# Patient Record
Sex: FEMALE | Race: WHITE | Employment: FULL TIME | URBAN - NONMETROPOLITAN AREA
[De-identification: names, ages, dates, MRNs, and addresses within clinical notes are randomized per-mention and may not be internally consistent; named-entity substitution may affect disease eponyms.]

---

## 2017-12-04 ENCOUNTER — HOSPITAL ENCOUNTER (OUTPATIENT)
Dept: OTHER | Age: 52
Discharge: HOME | End: 2017-12-04
Attending: NURSE PRACTITIONER | Admitting: NURSE PRACTITIONER

## 2017-12-04 ENCOUNTER — OFFICE VISIT (OUTPATIENT)
Dept: OTHER | Age: 52
End: 2017-12-04

## 2017-12-04 VITALS
DIASTOLIC BLOOD PRESSURE: 84 MMHG | BODY MASS INDEX: 28.89 KG/M2 | HEART RATE: 78 BPM | OXYGEN SATURATION: 95 % | SYSTOLIC BLOOD PRESSURE: 130 MMHG | TEMPERATURE: 98.2 F | WEIGHT: 190 LBS

## 2017-12-04 DIAGNOSIS — R05.9 COUGH: ICD-10-CM

## 2017-12-04 DIAGNOSIS — Z00.00 ROUTINE HEALTH MAINTENANCE: ICD-10-CM

## 2017-12-04 DIAGNOSIS — J31.0 RHINOSINUSITIS: Primary | ICD-10-CM

## 2017-12-04 DIAGNOSIS — B00.9 RECURRENT HSV (HERPES SIMPLEX VIRUS): ICD-10-CM

## 2017-12-04 DIAGNOSIS — J32.9 RHINOSINUSITIS: Primary | ICD-10-CM

## 2017-12-04 PROCEDURE — 99202 OFFICE O/P NEW SF 15 MIN: CPT | Performed by: NURSE PRACTITIONER

## 2017-12-04 RX ORDER — VALACYCLOVIR HYDROCHLORIDE 500 MG/1
500 TABLET, FILM COATED ORAL DAILY PRN
Qty: 30 TABLET | Refills: 6 | Status: SHIPPED | OUTPATIENT
Start: 2017-12-04 | End: 2019-03-13 | Stop reason: SDUPTHER

## 2017-12-04 RX ORDER — DOXYCYCLINE HYCLATE 100 MG/1
100 CAPSULE ORAL 2 TIMES DAILY
Qty: 14 CAPSULE | Refills: 0 | Status: SHIPPED | OUTPATIENT
Start: 2017-12-04 | End: 2017-12-11

## 2017-12-04 RX ORDER — BENZONATATE 100 MG/1
100 CAPSULE ORAL 3 TIMES DAILY PRN
Qty: 21 CAPSULE | Refills: 0 | Status: SHIPPED | OUTPATIENT
Start: 2017-12-04 | End: 2017-12-11

## 2017-12-04 RX ORDER — AZELASTINE 1 MG/ML
2 SPRAY, METERED NASAL 2 TIMES DAILY
Qty: 1 BOTTLE | Refills: 3 | Status: SHIPPED | OUTPATIENT
Start: 2017-12-04 | End: 2018-01-31 | Stop reason: ALTCHOICE

## 2017-12-04 ASSESSMENT — ENCOUNTER SYMPTOMS
CHOKING: 1
NAUSEA: 0
TROUBLE SWALLOWING: 0
SINUS PAIN: 1
CHEST TIGHTNESS: 0
VOMITING: 0
SINUS PRESSURE: 1
SHORTNESS OF BREATH: 1
SORE THROAT: 0
RHINORRHEA: 1
FACIAL SWELLING: 0
COUGH: 1
WHEEZING: 0
DIARRHEA: 0

## 2017-12-04 NOTE — PATIENT INSTRUCTIONS
nasal passages open and wash out mucus and bacteria. You can buy saline nose drops at a grocery store or drugstore. Or you can make your own at home by adding 1 teaspoon of salt and 1 teaspoon of baking soda to 2 cups of distilled water. If you make your own, fill a bulb syringe with the solution, insert the tip into your nostril, and squeeze gently. Ethelda Josette your nose. · Put a hot, wet towel or a warm gel pack on your face 3 or 4 times a day for 5 to 10 minutes each time. · Try a decongestant nasal spray like oxymetazoline (Afrin). Do not use it for more than 3 days in a row. Using it for more than 3 days can make your congestion worse. When should you call for help? Call your doctor now or seek immediate medical care if:  · You have new or worse swelling or redness in your face or around your eyes. · You have a new or higher fever. Watch closely for changes in your health, and be sure to contact your doctor if:  · You have new or worse facial pain. · The mucus from your nose becomes thicker (like pus) or has new blood in it. · You are not getting better as expected. Where can you learn more? Go to https://[employer]. org and sign in to your [employer] account. Enter O087 in the [employer] box to learn more about \"Sinusitis: Care Instructions. \"     If you do not have an account, please click on the \"Sign Up Now\" link. Current as of: July 29, 2016  Content Version: 11.3  © 6169-9291 [employer]. Care instructions adapted under license by [employer]. If you have questions about a medical condition or this instruction, always ask your healthcare professional. [place] [address] any warranty or liability for your use of this information. Patient Education        Cough: Care Instructions  Your Care Instructions  A cough is your body's response to something that bothers your throat or airways. Many things can cause a cough.  You might cough because of a cold or the flu, bronchitis, or asthma. Smoking, postnasal drip, allergies, and stomach acid that backs up into your throat also can cause coughs. A cough is a symptom, not a disease. Most coughs stop when the cause, such as a cold, goes away. You can take a few steps at home to cough less and feel better. Follow-up care is a key part of your treatment and safety. Be sure to make and go to all appointments, and call your doctor if you are having problems. It's also a good idea to know your test results and keep a list of the medicines you take. How can you care for yourself at home? · Drink lots of water and other fluids. This helps thin the mucus and soothes a dry or sore throat. Honey or lemon juice in hot water or tea may ease a dry cough. · Take cough medicine as directed by your doctor. · Prop up your head on pillows to help you breathe and ease a dry cough. · Try cough drops to soothe a dry or sore throat. Cough drops don't stop a cough. Medicine-flavored cough drops are no better than candy-flavored drops or hard candy. · Do not smoke. Avoid secondhand smoke. If you need help quitting, talk to your doctor about stop-smoking programs and medicines. These can increase your chances of quitting for good. When should you call for help? Call 911 anytime you think you may need emergency care. For example, call if:  · You have severe trouble breathing. Call your doctor now or seek immediate medical care if:  · You cough up blood. · You have new or worse trouble breathing. · You have a new or higher fever. · You have a new rash. Watch closely for changes in your health, and be sure to contact your doctor if:  · You cough more deeply or more often, especially if you notice more mucus or a change in the color of your mucus. · You have new symptoms, such as a sore throat, an earache, or sinus pain. · You do not get better as expected. Where can you learn more?   Go to over-the-counter:  ¨ Nicotine patches  ¨ Nicotine gum and lozenges  ¨ Nicotine inhaler  · Ask your doctor about bupropion (Wellbutrin) or varenicline (Chantix), which are prescription medicines. They do not contain nicotine. They help you by reducing withdrawal symptoms, such as stress and anxiety. · Some people find hypnosis, acupuncture, and massage helpful for ending the smoking habit. · Eat a healthy diet and get regular exercise. Having healthy habits will help your body move past its craving for nicotine. · Be prepared to keep trying. Most people are not successful the first few times they try to quit. Do not get mad at yourself if you smoke again. Make a list of things you learned and think about when you want to try again, such as next week, next month, or next year. Where can you learn more? Go to https://[employer]HungerTime. org and sign in to your [employer] account. Enter K765 in the [employer] box to learn more about \"Stopping Smoking: Care Instructions. \"     If you do not have an account, please click on the \"Sign Up Now\" link. Current as of: March 20, 2017  Content Version: 11.3  © 3970-1822 [employer]. Care instructions adapted under license by [facility] ([facility]). If you have questions about a medical condition or this instruction, always ask your healthcare professional. [address] any warranty or liability for your use of this information.

## 2017-12-04 NOTE — LETTER
[address]  [address] [facility] [address]  Phone: [phone]  Fax: [phone]    Armando Mc        December 4, 2017     Patient: Didi Fernandez   YOB: 1965   Date of Visit: 12/4/2017       To Whom It May Concern: It is my medical opinion that Mohinder Bear may return to work on Tuesday, December 5, 2017. If you have any questions or concerns, please don't hesitate to call.     Sincerely,        Jonathan Ren, CNP

## 2017-12-04 NOTE — PROGRESS NOTES
Negative for chest tightness and wheezing. Cardiovascular: Negative for chest pain, palpitations and leg swelling. Gastrointestinal: Negative for diarrhea, nausea and vomiting. Skin: Negative for rash. Neurological: Positive for headaches. OBJECTIVE:     /84 (Site: Left Arm, Position: Sitting, Cuff Size: Medium Adult)   Pulse 78   Temp 98.2 °F (36.8 °C) (Oral)   Wt 190 lb (86.2 kg)   SpO2 95%   BMI 28.89 kg/m²     Physical Exam   Constitutional: She is oriented to person, place, and time. She appears well-developed and well-nourished. HENT:   Head: Normocephalic. Right Ear: External ear and ear canal normal. Tympanic membrane is not erythematous and not bulging. Left Ear: External ear and ear canal normal. Tympanic membrane is bulging. Tympanic membrane is not erythematous. Nose: Mucosal edema and rhinorrhea present. Right sinus exhibits frontal sinus tenderness. Right sinus exhibits no maxillary sinus tenderness. Left sinus exhibits frontal sinus tenderness. Left sinus exhibits no maxillary sinus tenderness. Mouth/Throat: Uvula is midline and mucous membranes are normal. Oropharyngeal exudate (PND) and posterior oropharyngeal erythema present. No posterior oropharyngeal edema. Eyes: Pupils are equal, round, and reactive to light. Cardiovascular: Normal rate, regular rhythm and normal heart sounds. Pulmonary/Chest: Effort normal and breath sounds normal. No respiratory distress. She has no wheezes. She has no rales. Lymphadenopathy:     She has cervical adenopathy. Neurological: She is alert and oriented to person, place, and time. Skin: Skin is warm and dry. ASSESSMENT AND PLAN:     1. Rhinosinusitis  - doxycycline hyclate (VIBRAMYCIN) 100 MG capsule; Take 1 capsule by mouth 2 times daily for 7 days  Dispense: 14 capsule;  Refill: 0  - azelastine (ASTELIN) 0.1 % nasal spray; 2 sprays by Nasal route 2 times daily Use in each nostril as directed  Dispense: 1 Bottle; Refill: 3  - Continue sinus irrigation, humidification. Encouraged warm compresses w/precaution to face    2. Cough  - azelastine (ASTELIN) 0.1 % nasal spray; 2 sprays by Nasal route 2 times daily Use in each nostril as directed  Dispense: 1 Bottle; Refill: 3  - benzonatate (TESSALON PERLES) 100 MG capsule; Take 1 capsule by mouth 3 times daily as needed for Cough  Dispense: 21 capsule; Refill: 0  - Encouraged to quit smoking    3. Recurrent HSV (herpes simplex virus)  - valACYclovir (VALTREX) 500 MG tablet; Take 1 tablet by mouth daily as needed (HSV outbreak) For oral ulcer flares  Dispense: 30 tablet; Refill: 6    4. Routine health maintenance  - Had routine labs done for employment purposes approximately 3 months ago. She is agreeable to bring record to primary care appt  - Deferred pneumovax and tdap to primary care  - HEPATITIS C ANTIBODY; Future  - HIV-1 AND HIV-2 ANTIBODIES; Future    Future Appointments      Provider Champ Champion   1/3/2018 3:00 PM Delilah Coreas CNP [address] [place] [address] FM & PEDS [facility]       Return if symptoms worsen or fail to improve. Care discussed with patient. Questions answered and patient verbalizes understanding and agrees with plan. Medications reviewed and reconciled. Continue current medications. Appropriate prescriptions are ordered. Risks and benefits of meds are discussed. After visit summary provided. Advised to call for any problems, questions, or concerns. Patient educated on signs and symptoms of exacerbation and when to seek further medical attention. Patient verbalized understanding of these signs and symptoms as well as the plan of care. Patient will be contacted in two to three days to check on progress.

## 2017-12-06 ENCOUNTER — TELEPHONE (OUTPATIENT)
Dept: OTHER | Age: 52
End: 2017-12-06

## 2018-01-03 ENCOUNTER — OFFICE VISIT (OUTPATIENT)
Dept: FAMILY MEDICINE CLINIC | Age: 53
End: 2018-01-03

## 2018-01-03 ENCOUNTER — HOSPITAL ENCOUNTER (OUTPATIENT)
Dept: GENERAL RADIOLOGY | Age: 53
Discharge: HOME | End: 2018-01-03
Attending: NURSE PRACTITIONER | Admitting: NURSE PRACTITIONER

## 2018-01-03 VITALS
WEIGHT: 196.4 LBS | RESPIRATION RATE: 16 BRPM | BODY MASS INDEX: 29.77 KG/M2 | HEIGHT: 68 IN | SYSTOLIC BLOOD PRESSURE: 120 MMHG | HEART RATE: 87 BPM | OXYGEN SATURATION: 97 % | DIASTOLIC BLOOD PRESSURE: 80 MMHG

## 2018-01-03 DIAGNOSIS — R06.2 WHEEZING: ICD-10-CM

## 2018-01-03 DIAGNOSIS — R05.9 COUGH: ICD-10-CM

## 2018-01-03 DIAGNOSIS — Z00.00 ROUTINE HEALTH MAINTENANCE: ICD-10-CM

## 2018-01-03 DIAGNOSIS — J40 BRONCHITIS: Primary | ICD-10-CM

## 2018-01-03 DIAGNOSIS — Z12.31 ENCOUNTER FOR SCREENING MAMMOGRAM FOR BREAST CANCER: ICD-10-CM

## 2018-01-03 DIAGNOSIS — M54.50 ACUTE MIDLINE LOW BACK PAIN WITHOUT SCIATICA: ICD-10-CM

## 2018-01-03 DIAGNOSIS — Z12.11 SCREENING FOR COLON CANCER: ICD-10-CM

## 2018-01-03 DIAGNOSIS — J34.89 RHINORRHEA: ICD-10-CM

## 2018-01-03 DIAGNOSIS — Z13.220 SCREENING FOR HYPERLIPIDEMIA: ICD-10-CM

## 2018-01-03 LAB
ALBUMIN SERPL-MCNC: 4 GM/DL (ref 3.4–5)
ALP BLD-CCNC: 96 IU/L (ref 40–129)
ALT SERPL-CCNC: 23 U/L (ref 10–40)
ANION GAP SERPL CALCULATED.3IONS-SCNC: 7 MMOL/L (ref 4–16)
AST SERPL-CCNC: 19 IU/L (ref 15–37)
BASOPHILS ABSOLUTE: 0.1 K/CU MM
BASOPHILS RELATIVE PERCENT: 0.7 % (ref 0–1)
BILIRUB SERPL-MCNC: 0.2 MG/DL (ref 0–1)
BUN BLDV-MCNC: 15 MG/DL (ref 6–23)
CALCIUM SERPL-MCNC: 9.4 MG/DL (ref 8.3–10.6)
CHLORIDE BLD-SCNC: 103 MMOL/L (ref 99–110)
CO2: 33 MMOL/L (ref 21–32)
CREAT SERPL-MCNC: 0.8 MG/DL (ref 0.6–1.1)
DIFFERENTIAL TYPE: ABNORMAL
EOSINOPHILS ABSOLUTE: 0.1 K/CU MM
EOSINOPHILS RELATIVE PERCENT: 1.1 % (ref 0–3)
GFR AFRICAN AMERICAN: >60 ML/MIN/1.73M2
GFR NON-AFRICAN AMERICAN: >60 ML/MIN/1.73M2
GLUCOSE BLD-MCNC: 87 MG/DL (ref 70–99)
HCT VFR BLD CALC: 48.4 % (ref 37–47)
HEMOGLOBIN: 15.7 GM/DL (ref 12.5–16)
HIV SCREEN: NON REACTIVE
IMMATURE NEUTROPHIL %: 0.4 % (ref 0–0.43)
LYMPHOCYTES ABSOLUTE: 3.1 K/CU MM
LYMPHOCYTES RELATIVE PERCENT: 24.3 % (ref 24–44)
MCH RBC QN AUTO: 28.9 PG (ref 27–31)
MCHC RBC AUTO-ENTMCNC: 32.4 % (ref 32–36)
MCV RBC AUTO: 89 FL (ref 78–100)
MONOCYTES ABSOLUTE: 0.8 K/CU MM
MONOCYTES RELATIVE PERCENT: 6.1 % (ref 0–4)
PDW BLD-RTO: 14 % (ref 11.7–14.9)
PLATELET # BLD: 311 K/CU MM (ref 140–440)
PMV BLD AUTO: 11 FL (ref 7.5–11.1)
POTASSIUM SERPL-SCNC: 4.5 MMOL/L (ref 3.5–5.1)
RBC # BLD: 5.44 M/CU MM (ref 4.2–5.4)
SEGMENTED NEUTROPHILS ABSOLUTE COUNT: 8.7 K/CU MM
SEGMENTED NEUTROPHILS RELATIVE PERCENT: 67.4 % (ref 36–66)
SODIUM BLD-SCNC: 143 MMOL/L (ref 135–145)
TOTAL IMMATURE NEUTOROPHIL: 0.05 K/CU MM
TOTAL PROTEIN: 7.1 GM/DL (ref 6.4–8.2)
WBC # BLD: 12.8 K/CU MM (ref 4–10.5)

## 2018-01-03 PROCEDURE — 99214 OFFICE O/P EST MOD 30 MIN: CPT | Performed by: NURSE PRACTITIONER

## 2018-01-03 RX ORDER — ALBUTEROL SULFATE 90 UG/1
2 AEROSOL, METERED RESPIRATORY (INHALATION) EVERY 6 HOURS PRN
Qty: 1 INHALER | Refills: 0 | Status: SHIPPED | OUTPATIENT
Start: 2018-01-03 | End: 2018-04-30 | Stop reason: CLARIF

## 2018-01-03 RX ORDER — PREDNISONE 20 MG/1
TABLET ORAL
Qty: 30 TABLET | Refills: 0 | Status: SHIPPED | OUTPATIENT
Start: 2018-01-03 | End: 2018-01-31 | Stop reason: ALTCHOICE

## 2018-01-03 RX ORDER — BENZONATATE 100 MG/1
100-200 CAPSULE ORAL 3 TIMES DAILY PRN
Qty: 30 CAPSULE | Refills: 0 | Status: SHIPPED | OUTPATIENT
Start: 2018-01-03 | End: 2018-01-10

## 2018-01-03 RX ORDER — FLUTICASONE PROPIONATE 50 MCG
1-2 SPRAY, SUSPENSION (ML) NASAL DAILY
Qty: 1 BOTTLE | Refills: 1 | Status: SHIPPED | OUTPATIENT
Start: 2018-01-03 | End: 2019-03-13 | Stop reason: ALTCHOICE

## 2018-01-03 ASSESSMENT — ENCOUNTER SYMPTOMS
COUGH: 1
CHEST TIGHTNESS: 0
BACK PAIN: 1
WHEEZING: 1
ABDOMINAL PAIN: 0
SORE THROAT: 1
RHINORRHEA: 1
DIARRHEA: 0
NAUSEA: 0
CONSTIPATION: 0
SHORTNESS OF BREATH: 0
VOMITING: 0

## 2018-01-03 ASSESSMENT — PATIENT HEALTH QUESTIONNAIRE - PHQ9
2. FEELING DOWN, DEPRESSED OR HOPELESS: 0
SUM OF ALL RESPONSES TO PHQ9 QUESTIONS 1 & 2: 0
1. LITTLE INTEREST OR PLEASURE IN DOING THINGS: 0
SUM OF ALL RESPONSES TO PHQ QUESTIONS 1-9: 0

## 2018-01-03 NOTE — PROGRESS NOTES
maintenance        Relevant Orders    CBC    Comprehensive Metabolic Panel    HIV Screen    Lipid Panel    TSH with Reflex    Cough        Relevant Orders    XR CHEST STANDARD (2 VW)    Wheezing        Relevant Medications    albuterol sulfate HFA (PROAIR HFA) 108 (90 Base) MCG/ACT inhaler    Rhinorrhea        Relevant Medications    fluticasone (FLONASE) 50 MCG/ACT nasal spray    Acute midline low back pain without sciatica        Relevant Medications    predniSONE (DELTASONE) 20 MG tablet          Review of Systems   Constitutional: Negative for appetite change, chills, fatigue and unexpected weight change. HENT: Positive for congestion, rhinorrhea and sore throat. Respiratory: Positive for cough and wheezing. Negative for chest tightness and shortness of breath. Cardiovascular: Negative for chest pain, palpitations and leg swelling. Gastrointestinal: Negative for abdominal pain, constipation, diarrhea, nausea and vomiting. Musculoskeletal: Positive for back pain. Negative for arthralgias. Neurological: Positive for headaches. Negative for weakness and numbness. Hematological: Negative for adenopathy. OBJECTIVE:    /80 (Site: Left Arm, Position: Sitting, Cuff Size: Medium Adult)   Pulse 87   Resp 16   Ht 5' 8\" (1.727 m)   Wt 196 lb 6.4 oz (89.1 kg)   SpO2 97%   BMI 29.86 kg/m²     Physical Exam   Constitutional: She is oriented to person, place, and time. She appears well-developed and well-nourished. HENT:   Head: Normocephalic and atraumatic. Right Ear: Hearing, tympanic membrane, external ear and ear canal normal.   Left Ear: Hearing, tympanic membrane, external ear and ear canal normal.   Nose: Mucosal edema present. Right sinus exhibits no maxillary sinus tenderness and no frontal sinus tenderness. Left sinus exhibits no maxillary sinus tenderness and no frontal sinus tenderness.    Mouth/Throat: Uvula is midline and mucous membranes are normal. Oropharyngeal exudate (postnasal drainge) and posterior oropharyngeal erythema present. No posterior oropharyngeal edema. Eyes: Conjunctivae and EOM are normal. Pupils are equal, round, and reactive to light. Neck: Normal range of motion. Neck supple. Cardiovascular: Normal rate, regular rhythm and normal heart sounds. Exam reveals no gallop and no friction rub. No murmur heard. Pulmonary/Chest: Effort normal and breath sounds normal. No respiratory distress. She has no wheezes. She has no rhonchi. She has no rales. Abdominal: Soft. Bowel sounds are normal. She exhibits no distension and no mass. There is no tenderness. There is no guarding. Musculoskeletal: She exhibits no edema. Lumbar back: She exhibits decreased range of motion and tenderness. She exhibits no bony tenderness, no swelling and no edema. Negative SLR   Lymphadenopathy:     She has no cervical adenopathy. Right cervical: No superficial cervical and no posterior cervical adenopathy present. Left cervical: No superficial cervical and no posterior cervical adenopathy present. Neurological: She is alert and oriented to person, place, and time. No cranial nerve deficit. Skin: Skin is warm and dry. Psychiatric: She has a normal mood and affect. Her behavior is normal.       ASSESSMENT/PLAN:    1. Encounter for screening mammogram for breast cancer  - [facility] Digital Screen Bilateral [[id number]]; Future    2. Screening for colon cancer  - POCT FECAL IMMUNOCHEMICAL TEST (FIT); Future  - [address] [place] [address] Gastroenterology- Kelly Daley MD    3. Screening for hyperlipidemia    4. Routine health maintenance  - CBC; Future  - Comprehensive Metabolic Panel; Future  - HIV Screen; Future  - Lipid Panel; Future  - TSH with Reflex; Future    5. Cough  Xray d/t length of cough  - XR CHEST STANDARD (2 VW); Future    6. Wheezing  Albuterol PRN  - albuterol sulfate HFA (PROAIR HFA) 108 (90 Base) MCG/ACT inhaler;  Inhale 2 puffs into the lungs every 6

## 2018-01-04 DIAGNOSIS — Z11.59 NEED FOR HEPATITIS C SCREENING TEST: Primary | ICD-10-CM

## 2018-01-04 LAB
CHOLESTEROL: 189 MG/DL
HDLC SERPL-MCNC: 43 MG/DL
HEPATITIS C ANTIBODY: NON REACTIVE
LDL CHOLESTEROL DIRECT: 114 MG/DL
TRIGL SERPL-MCNC: 140 MG/DL
TSH HIGH SENSITIVITY: 2.08 UIU/ML (ref 0.27–4.2)

## 2018-01-04 RX ORDER — ATORVASTATIN CALCIUM 20 MG/1
20 TABLET, FILM COATED ORAL DAILY
Qty: 30 TABLET | Refills: 3 | Status: SHIPPED | OUTPATIENT
Start: 2018-01-04 | End: 2018-01-17

## 2018-01-17 ENCOUNTER — OFFICE VISIT (OUTPATIENT)
Dept: FAMILY MEDICINE CLINIC | Age: 53
End: 2018-01-17

## 2018-01-17 VITALS
HEART RATE: 100 BPM | SYSTOLIC BLOOD PRESSURE: 124 MMHG | BODY MASS INDEX: 29.07 KG/M2 | RESPIRATION RATE: 16 BRPM | WEIGHT: 191.2 LBS | OXYGEN SATURATION: 98 % | DIASTOLIC BLOOD PRESSURE: 82 MMHG

## 2018-01-17 DIAGNOSIS — Z72.0 TOBACCO ABUSE: ICD-10-CM

## 2018-01-17 DIAGNOSIS — R53.83 FATIGUE, UNSPECIFIED TYPE: ICD-10-CM

## 2018-01-17 DIAGNOSIS — R09.81 NASAL CONGESTION: Primary | ICD-10-CM

## 2018-01-17 DIAGNOSIS — R06.02 SOB (SHORTNESS OF BREATH): ICD-10-CM

## 2018-01-17 LAB
BASOPHILS ABSOLUTE: 0.1 K/UL (ref 0–0.2)
BASOPHILS RELATIVE PERCENT: 0.8 %
EOSINOPHILS ABSOLUTE: 0.1 K/UL (ref 0–0.6)
EOSINOPHILS RELATIVE PERCENT: 1.2 %
HCT VFR BLD CALC: 46.9 % (ref 36–48)
HEMOGLOBIN: 15.7 G/DL (ref 12–16)
LYMPHOCYTES ABSOLUTE: 2.3 K/UL (ref 1–5.1)
LYMPHOCYTES RELATIVE PERCENT: 18.1 %
MCH RBC QN AUTO: 29.8 PG (ref 26–34)
MCHC RBC AUTO-ENTMCNC: 33.6 G/DL (ref 31–36)
MCV RBC AUTO: 88.9 FL (ref 80–100)
MONOCYTES ABSOLUTE: 0.8 K/UL (ref 0–1.3)
MONOCYTES RELATIVE PERCENT: 6.3 %
NEUTROPHILS ABSOLUTE: 9.2 K/UL (ref 1.7–7.7)
NEUTROPHILS RELATIVE PERCENT: 73.6 %
PDW BLD-RTO: 15.1 % (ref 12.4–15.4)
PLATELET # BLD: 319 K/UL (ref 135–450)
PMV BLD AUTO: 9.4 FL (ref 5–10.5)
RBC # BLD: 5.27 M/UL (ref 4–5.2)
WBC # BLD: 12.5 K/UL (ref 4–11)

## 2018-01-17 PROCEDURE — 99213 OFFICE O/P EST LOW 20 MIN: CPT | Performed by: NURSE PRACTITIONER

## 2018-01-17 RX ORDER — LORATADINE 10 MG/1
10 TABLET ORAL DAILY
Qty: 30 TABLET | Refills: 1 | Status: SHIPPED | OUTPATIENT
Start: 2018-01-17 | End: 2018-03-31 | Stop reason: SDUPTHER

## 2018-01-17 ASSESSMENT — ENCOUNTER SYMPTOMS
VOMITING: 0
WHEEZING: 0
CONSTIPATION: 0
CHEST TIGHTNESS: 0
SHORTNESS OF BREATH: 0
DIARRHEA: 0
NAUSEA: 0
SINUS PAIN: 1
SINUS PRESSURE: 1
COUGH: 1
SORE THROAT: 0
ABDOMINAL PAIN: 0

## 2018-01-31 ENCOUNTER — OFFICE VISIT (OUTPATIENT)
Dept: FAMILY MEDICINE CLINIC | Age: 53
End: 2018-01-31

## 2018-01-31 VITALS
OXYGEN SATURATION: 94 % | HEART RATE: 87 BPM | BODY MASS INDEX: 29.56 KG/M2 | DIASTOLIC BLOOD PRESSURE: 88 MMHG | WEIGHT: 194.4 LBS | RESPIRATION RATE: 16 BRPM | SYSTOLIC BLOOD PRESSURE: 126 MMHG

## 2018-01-31 DIAGNOSIS — R09.81 NASAL CONGESTION: ICD-10-CM

## 2018-01-31 DIAGNOSIS — Z12.11 SCREENING FOR COLON CANCER: ICD-10-CM

## 2018-01-31 DIAGNOSIS — Z23 NEED FOR PROPHYLACTIC VACCINATION AGAINST DIPHTHERIA-TETANUS-PERTUSSIS (DTP): ICD-10-CM

## 2018-01-31 DIAGNOSIS — Z23 NEED FOR PROPHYLACTIC VACCINATION AGAINST STREPTOCOCCUS PNEUMONIAE (PNEUMOCOCCUS): ICD-10-CM

## 2018-01-31 DIAGNOSIS — Z12.31 ENCOUNTER FOR SCREENING MAMMOGRAM FOR BREAST CANCER: ICD-10-CM

## 2018-01-31 DIAGNOSIS — L85.3 DRY SKIN DERMATITIS: Primary | ICD-10-CM

## 2018-01-31 PROCEDURE — 99213 OFFICE O/P EST LOW 20 MIN: CPT | Performed by: NURSE PRACTITIONER

## 2018-01-31 PROCEDURE — 90471 IMMUNIZATION ADMIN: CPT | Performed by: NURSE PRACTITIONER

## 2018-01-31 PROCEDURE — 90472 IMMUNIZATION ADMIN EACH ADD: CPT | Performed by: NURSE PRACTITIONER

## 2018-01-31 PROCEDURE — 90715 TDAP VACCINE 7 YRS/> IM: CPT | Performed by: NURSE PRACTITIONER

## 2018-01-31 PROCEDURE — 90732 PPSV23 VACC 2 YRS+ SUBQ/IM: CPT | Performed by: NURSE PRACTITIONER

## 2018-01-31 ASSESSMENT — ENCOUNTER SYMPTOMS
SHORTNESS OF BREATH: 0
VOMITING: 0
NAUSEA: 0
WHEEZING: 0
DIARRHEA: 0
ABDOMINAL PAIN: 0
CONSTIPATION: 0
CHEST TIGHTNESS: 0
COUGH: 1

## 2018-01-31 NOTE — PROGRESS NOTES
prophylactic vaccination against diphtheria-tetanus-pertussis (DTP)        Relevant Orders    Tdap (age 10y-63y) IM (Adacel)    Nasal congestion              Review of Systems   Constitutional: Negative for appetite change, chills, fatigue, fever and unexpected weight change. Respiratory: Positive for cough. Negative for chest tightness, shortness of breath and wheezing. Cardiovascular: Negative for chest pain, palpitations and leg swelling. Gastrointestinal: Negative for abdominal pain, constipation, diarrhea, nausea and vomiting. Musculoskeletal: Negative for arthralgias. Neurological: Negative for weakness, numbness and headaches. Hematological: Negative for adenopathy. OBJECTIVE:    /88 (Site: Right Arm, Position: Sitting, Cuff Size: Medium Adult)   Pulse 87   Resp 16   Wt 194 lb 6.4 oz (88.2 kg)   SpO2 94%   BMI 29.56 kg/m²     Physical Exam   Constitutional: She is oriented to person, place, and time. She appears well-developed and well-nourished. HENT:   Head: Normocephalic and atraumatic. Nose: Mucosal edema present. Mouth/Throat: Uvula is midline and mucous membranes are normal. Oropharyngeal exudate (postnasal drainage) present. No posterior oropharyngeal edema or posterior oropharyngeal erythema. Neck: Normal range of motion. Neck supple. Cardiovascular: Normal rate, regular rhythm and normal heart sounds. Exam reveals no gallop and no friction rub. No murmur heard. Pulmonary/Chest: Effort normal and breath sounds normal. No respiratory distress. She has no wheezes. She has no rhonchi. She has no rales. Abdominal: Soft. Bowel sounds are normal. She exhibits no distension. There is no tenderness. There is no guarding. Neurological: She is alert and oriented to person, place, and time. Skin: Skin is warm and dry. Bilateral hands with very dry skin, some areas callused   Psychiatric: She has a normal mood and affect.  Her behavior is normal.

## 2018-02-21 ENCOUNTER — TELEPHONE (OUTPATIENT)
Dept: FAMILY MEDICINE CLINIC | Age: 53
End: 2018-02-21

## 2018-02-21 NOTE — TELEPHONE ENCOUNTER
Please call patient and remind her to schedule mammogram and colonoscopy. She can call [facility] Scheduling at [phone] to schedule.

## 2018-02-22 DIAGNOSIS — Z12.11 SCREENING FOR COLON CANCER: ICD-10-CM

## 2018-02-22 LAB
CONTROL: PRESENT
HEMOCCULT STL QL: NORMAL

## 2018-03-31 DIAGNOSIS — R09.81 NASAL CONGESTION: ICD-10-CM

## 2018-04-02 RX ORDER — LORATADINE 10 MG/1
10 TABLET ORAL DAILY
Qty: 30 TABLET | Refills: 1 | Status: SHIPPED | OUTPATIENT
Start: 2018-04-02 | End: 2019-03-13

## 2018-04-12 ENCOUNTER — TELEPHONE (OUTPATIENT)
Dept: FAMILY MEDICINE CLINIC | Age: 53
End: 2018-04-12

## 2018-04-30 ENCOUNTER — OFFICE VISIT (OUTPATIENT)
Dept: FAMILY MEDICINE CLINIC | Age: 53
End: 2018-04-30

## 2018-04-30 VITALS
BODY MASS INDEX: 29.44 KG/M2 | HEART RATE: 80 BPM | SYSTOLIC BLOOD PRESSURE: 120 MMHG | OXYGEN SATURATION: 96 % | WEIGHT: 193.6 LBS | DIASTOLIC BLOOD PRESSURE: 76 MMHG | RESPIRATION RATE: 16 BRPM

## 2018-04-30 DIAGNOSIS — L85.3 DRY SKIN DERMATITIS: ICD-10-CM

## 2018-04-30 DIAGNOSIS — Z72.0 TOBACCO ABUSE: ICD-10-CM

## 2018-04-30 PROCEDURE — 99213 OFFICE O/P EST LOW 20 MIN: CPT | Performed by: NURSE PRACTITIONER

## 2018-04-30 ASSESSMENT — ENCOUNTER SYMPTOMS
DIARRHEA: 0
CHEST TIGHTNESS: 0
SHORTNESS OF BREATH: 0
NAUSEA: 0
CONSTIPATION: 0
ABDOMINAL PAIN: 0
WHEEZING: 0
VOMITING: 0
COUGH: 1

## 2018-08-23 ENCOUNTER — TELEPHONE (OUTPATIENT)
Dept: FAMILY MEDICINE CLINIC | Age: 53
End: 2018-08-23

## 2019-01-11 ENCOUNTER — HOSPITAL ENCOUNTER (EMERGENCY)
Age: 54
Discharge: HOME | End: 2019-01-11
Attending: EMERGENCY MEDICINE
Payer: COMMERCIAL

## 2019-01-11 ENCOUNTER — APPOINTMENT (OUTPATIENT)
Dept: CT IMAGING | Age: 54
End: 2019-01-11
Payer: COMMERCIAL

## 2019-01-11 VITALS
RESPIRATION RATE: 22 BRPM | SYSTOLIC BLOOD PRESSURE: 156 MMHG | TEMPERATURE: 98.6 F | WEIGHT: 185 LBS | HEART RATE: 73 BPM | OXYGEN SATURATION: 95 % | HEIGHT: 68 IN | DIASTOLIC BLOOD PRESSURE: 98 MMHG | BODY MASS INDEX: 28.04 KG/M2

## 2019-01-11 DIAGNOSIS — N20.1 URETEROLITHIASIS: Primary | ICD-10-CM

## 2019-01-11 LAB
ALBUMIN SERPL-MCNC: 4.3 GM/DL (ref 3.4–5)
ALP BLD-CCNC: 99 IU/L (ref 40–129)
ALT SERPL-CCNC: 26 U/L (ref 10–40)
ANION GAP SERPL CALCULATED.3IONS-SCNC: 13 MMOL/L (ref 4–16)
AST SERPL-CCNC: 18 IU/L (ref 15–37)
BACTERIA: ABNORMAL /HPF
BASOPHILS ABSOLUTE: 0.1 K/CU MM
BASOPHILS RELATIVE PERCENT: 0.6 % (ref 0–1)
BILIRUB SERPL-MCNC: 0.3 MG/DL (ref 0–1)
BILIRUBIN URINE: ABNORMAL MG/DL
BLOOD, URINE: ABNORMAL
BUN BLDV-MCNC: 13 MG/DL (ref 6–23)
CALCIUM SERPL-MCNC: 10.5 MG/DL (ref 8.3–10.6)
CAST TYPE: ABNORMAL /HPF
CHLORIDE BLD-SCNC: 101 MMOL/L (ref 99–110)
CLARITY: ABNORMAL
CO2: 24 MMOL/L (ref 21–32)
COLOR: ABNORMAL
CREAT SERPL-MCNC: 0.8 MG/DL (ref 0.6–1.1)
CRYSTAL TYPE: ABNORMAL /HPF
DIFFERENTIAL TYPE: ABNORMAL
EOSINOPHILS ABSOLUTE: 0.1 K/CU MM
EOSINOPHILS RELATIVE PERCENT: 0.8 % (ref 0–3)
EPITHELIAL CELLS, UA: ABNORMAL /HPF
GFR AFRICAN AMERICAN: >60 ML/MIN/1.73M2
GFR NON-AFRICAN AMERICAN: >60 ML/MIN/1.73M2
GLUCOSE BLD-MCNC: 118 MG/DL (ref 70–99)
GLUCOSE, URINE: NEGATIVE MG/DL
HCT VFR BLD CALC: 48 % (ref 37–47)
HEMOGLOBIN: 16 GM/DL (ref 12.5–16)
ICTOTEST: NEGATIVE
IMMATURE NEUTROPHIL %: 0.3 % (ref 0–0.43)
KETONES, URINE: ABNORMAL MG/DL
LEUKOCYTE ESTERASE, URINE: NEGATIVE
LYMPHOCYTES ABSOLUTE: 3.6 K/CU MM
LYMPHOCYTES RELATIVE PERCENT: 32.8 % (ref 24–44)
MCH RBC QN AUTO: 29.5 PG (ref 27–31)
MCHC RBC AUTO-ENTMCNC: 33.3 % (ref 32–36)
MCV RBC AUTO: 88.4 FL (ref 78–100)
MONOCYTES ABSOLUTE: 0.7 K/CU MM
MONOCYTES RELATIVE PERCENT: 6.2 % (ref 0–4)
MUCUS: ABNORMAL HPF
NITRITE URINE, QUANTITATIVE: POSITIVE
PDW BLD-RTO: 13.9 % (ref 11.7–14.9)
PH, URINE: 5.5 (ref 5–8)
PLATELET # BLD: 317 K/CU MM (ref 140–440)
PMV BLD AUTO: 10.3 FL (ref 7.5–11.1)
POTASSIUM SERPL-SCNC: 4.2 MMOL/L (ref 3.5–5.1)
PROTEIN UA: 100 MG/DL
RBC # BLD: 5.43 M/CU MM (ref 4.2–5.4)
RBC URINE: ABNORMAL /HPF (ref 0–6)
SEGMENTED NEUTROPHILS ABSOLUTE COUNT: 6.4 K/CU MM
SEGMENTED NEUTROPHILS RELATIVE PERCENT: 59.3 % (ref 36–66)
SODIUM BLD-SCNC: 138 MMOL/L (ref 135–145)
SPECIFIC GRAVITY UA: 1.03 (ref 1–1.03)
TOTAL IMMATURE NEUTOROPHIL: 0.03 K/CU MM
TOTAL PROTEIN: 7.1 GM/DL (ref 6.4–8.2)
UROBILINOGEN, URINE: 0.2 MG/DL (ref 0.2–1)
VOLUME, (UVOL): 12 ML (ref 10–12)
WBC # BLD: 10.8 K/CU MM (ref 4–10.5)
WBC UA: ABNORMAL /HPF (ref 0–5)

## 2019-01-11 PROCEDURE — 87086 URINE CULTURE/COLONY COUNT: CPT

## 2019-01-11 PROCEDURE — 6360000002 HC RX W HCPCS: Performed by: EMERGENCY MEDICINE

## 2019-01-11 PROCEDURE — 96372 THER/PROPH/DIAG INJ SC/IM: CPT

## 2019-01-11 PROCEDURE — 74176 CT ABD & PELVIS W/O CONTRAST: CPT

## 2019-01-11 PROCEDURE — 6370000000 HC RX 637 (ALT 250 FOR IP): Performed by: EMERGENCY MEDICINE

## 2019-01-11 PROCEDURE — 96375 TX/PRO/DX INJ NEW DRUG ADDON: CPT

## 2019-01-11 PROCEDURE — 99284 EMERGENCY DEPT VISIT MOD MDM: CPT

## 2019-01-11 PROCEDURE — 85025 COMPLETE CBC W/AUTO DIFF WBC: CPT

## 2019-01-11 PROCEDURE — 96374 THER/PROPH/DIAG INJ IV PUSH: CPT

## 2019-01-11 PROCEDURE — 2580000003 HC RX 258: Performed by: EMERGENCY MEDICINE

## 2019-01-11 PROCEDURE — 81001 URINALYSIS AUTO W/SCOPE: CPT

## 2019-01-11 PROCEDURE — 80053 COMPREHEN METABOLIC PANEL: CPT

## 2019-01-11 RX ORDER — CEPHALEXIN 500 MG/1
500 CAPSULE ORAL 3 TIMES DAILY
Qty: 21 CAPSULE | Refills: 0 | Status: SHIPPED | OUTPATIENT
Start: 2019-01-11 | End: 2019-01-18

## 2019-01-11 RX ORDER — DIPHENHYDRAMINE HYDROCHLORIDE 50 MG/ML
50 INJECTION INTRAMUSCULAR; INTRAVENOUS ONCE
Status: COMPLETED | OUTPATIENT
Start: 2019-01-11 | End: 2019-01-11

## 2019-01-11 RX ORDER — TAMSULOSIN HYDROCHLORIDE 0.4 MG/1
0.4 CAPSULE ORAL DAILY
Qty: 10 CAPSULE | Refills: 0 | Status: SHIPPED | OUTPATIENT
Start: 2019-01-11 | End: 2019-03-06 | Stop reason: ALTCHOICE

## 2019-01-11 RX ORDER — ONDANSETRON 2 MG/ML
4 INJECTION INTRAMUSCULAR; INTRAVENOUS EVERY 30 MIN PRN
Status: DISCONTINUED | OUTPATIENT
Start: 2019-01-11 | End: 2019-01-11 | Stop reason: HOSPADM

## 2019-01-11 RX ORDER — PROMETHAZINE HYDROCHLORIDE 25 MG/1
25 TABLET ORAL EVERY 6 HOURS PRN
Qty: 15 TABLET | Refills: 0 | Status: SHIPPED | OUTPATIENT
Start: 2019-01-11 | End: 2019-01-18

## 2019-01-11 RX ORDER — OXYCODONE HYDROCHLORIDE AND ACETAMINOPHEN 5; 325 MG/1; MG/1
2 TABLET ORAL ONCE
Status: COMPLETED | OUTPATIENT
Start: 2019-01-11 | End: 2019-01-11

## 2019-01-11 RX ORDER — KETOROLAC TROMETHAMINE 30 MG/ML
30 INJECTION, SOLUTION INTRAMUSCULAR; INTRAVENOUS ONCE
Status: COMPLETED | OUTPATIENT
Start: 2019-01-11 | End: 2019-01-11

## 2019-01-11 RX ORDER — OXYCODONE HYDROCHLORIDE AND ACETAMINOPHEN 5; 325 MG/1; MG/1
1 TABLET ORAL EVERY 6 HOURS PRN
Qty: 20 TABLET | Refills: 0 | Status: SHIPPED | OUTPATIENT
Start: 2019-01-11 | End: 2019-01-16

## 2019-01-11 RX ORDER — MORPHINE SULFATE 4 MG/ML
4 INJECTION, SOLUTION INTRAMUSCULAR; INTRAVENOUS EVERY 30 MIN PRN
Status: DISCONTINUED | OUTPATIENT
Start: 2019-01-11 | End: 2019-01-11 | Stop reason: HOSPADM

## 2019-01-11 RX ORDER — PROMETHAZINE HYDROCHLORIDE 25 MG/ML
25 INJECTION, SOLUTION INTRAMUSCULAR; INTRAVENOUS ONCE
Status: COMPLETED | OUTPATIENT
Start: 2019-01-11 | End: 2019-01-11

## 2019-01-11 RX ADMIN — OXYCODONE HYDROCHLORIDE AND ACETAMINOPHEN 2 TABLET: 5; 325 TABLET ORAL at 16:43

## 2019-01-11 RX ADMIN — PROMETHAZINE HYDROCHLORIDE 25 MG: 25 INJECTION INTRAMUSCULAR; INTRAVENOUS at 13:52

## 2019-01-11 RX ADMIN — LIDOCAINE HYDROCHLORIDE 80 MG: 20 INJECTION, SOLUTION INTRAVENOUS at 15:13

## 2019-01-11 RX ADMIN — KETOROLAC TROMETHAMINE 30 MG: 30 INJECTION, SOLUTION INTRAMUSCULAR at 14:07

## 2019-01-11 RX ADMIN — DIPHENHYDRAMINE HYDROCHLORIDE 50 MG: 50 INJECTION, SOLUTION INTRAMUSCULAR; INTRAVENOUS at 14:07

## 2019-01-11 RX ADMIN — ONDANSETRON 4 MG: 2 INJECTION INTRAMUSCULAR; INTRAVENOUS at 13:21

## 2019-01-11 RX ADMIN — MORPHINE SULFATE 4 MG: 4 INJECTION, SOLUTION INTRAMUSCULAR; INTRAVENOUS at 13:21

## 2019-01-11 ASSESSMENT — PAIN DESCRIPTION - FREQUENCY: FREQUENCY: CONTINUOUS

## 2019-01-11 ASSESSMENT — PAIN DESCRIPTION - ORIENTATION: ORIENTATION: RIGHT

## 2019-01-11 ASSESSMENT — PAIN DESCRIPTION - DESCRIPTORS: DESCRIPTORS: ACHING

## 2019-01-11 ASSESSMENT — PAIN DESCRIPTION - PAIN TYPE: TYPE: ACUTE PAIN

## 2019-01-11 ASSESSMENT — PAIN SCALES - GENERAL
PAINLEVEL_OUTOF10: 9
PAINLEVEL_OUTOF10: 10
PAINLEVEL_OUTOF10: 6
PAINLEVEL_OUTOF10: 9

## 2019-01-11 ASSESSMENT — PAIN DESCRIPTION - LOCATION: LOCATION: BACK;FLANK

## 2019-01-13 LAB
CULTURE: NORMAL
Lab: NORMAL
REPORT STATUS: NORMAL
SPECIMEN: NORMAL

## 2019-01-18 NOTE — PROGRESS NOTES
Patient hasn't been in the office since April 2018 and this order is over 3year old. I sent a mammo reminder letter today.

## 2019-03-06 ENCOUNTER — APPOINTMENT (OUTPATIENT)
Dept: GENERAL RADIOLOGY | Age: 54
End: 2019-03-06
Payer: COMMERCIAL

## 2019-03-06 ENCOUNTER — HOSPITAL ENCOUNTER (EMERGENCY)
Age: 54
Discharge: HOME | End: 2019-03-06
Attending: EMERGENCY MEDICINE
Payer: COMMERCIAL

## 2019-03-06 VITALS
WEIGHT: 185 LBS | TEMPERATURE: 97.8 F | DIASTOLIC BLOOD PRESSURE: 82 MMHG | OXYGEN SATURATION: 95 % | SYSTOLIC BLOOD PRESSURE: 137 MMHG | HEART RATE: 77 BPM | BODY MASS INDEX: 28.04 KG/M2 | HEIGHT: 68 IN | RESPIRATION RATE: 16 BRPM

## 2019-03-06 DIAGNOSIS — R11.2 NAUSEA VOMITING AND DIARRHEA: ICD-10-CM

## 2019-03-06 DIAGNOSIS — R19.7 NAUSEA VOMITING AND DIARRHEA: ICD-10-CM

## 2019-03-06 DIAGNOSIS — J06.9 UPPER RESPIRATORY TRACT INFECTION, UNSPECIFIED TYPE: Primary | ICD-10-CM

## 2019-03-06 LAB
RAPID INFLUENZA  B AGN: NEGATIVE
RAPID INFLUENZA A AGN: NEGATIVE

## 2019-03-06 PROCEDURE — 87804 INFLUENZA ASSAY W/OPTIC: CPT

## 2019-03-06 PROCEDURE — 71046 X-RAY EXAM CHEST 2 VIEWS: CPT

## 2019-03-06 PROCEDURE — 99283 EMERGENCY DEPT VISIT LOW MDM: CPT

## 2019-03-06 PROCEDURE — 6370000000 HC RX 637 (ALT 250 FOR IP): Performed by: EMERGENCY MEDICINE

## 2019-03-06 RX ORDER — GUAIFENESIN/DEXTROMETHORPHAN 100-10MG/5
5 SYRUP ORAL 4 TIMES DAILY PRN
Qty: 120 ML | Refills: 0 | Status: SHIPPED | OUTPATIENT
Start: 2019-03-06 | End: 2019-03-13 | Stop reason: ALTCHOICE

## 2019-03-06 RX ORDER — ACETAMINOPHEN 500 MG
1000 TABLET ORAL ONCE
Status: COMPLETED | OUTPATIENT
Start: 2019-03-06 | End: 2019-03-06

## 2019-03-06 RX ORDER — PROMETHAZINE HYDROCHLORIDE 25 MG/1
25 TABLET ORAL ONCE
Status: COMPLETED | OUTPATIENT
Start: 2019-03-06 | End: 2019-03-06

## 2019-03-06 RX ORDER — OXYMETAZOLINE HYDROCHLORIDE 0.05 G/100ML
2 SPRAY NASAL 2 TIMES DAILY PRN
Qty: 1 BOTTLE | Refills: 0 | Status: SHIPPED | OUTPATIENT
Start: 2019-03-06 | End: 2019-03-09

## 2019-03-06 RX ORDER — PROMETHAZINE HYDROCHLORIDE 25 MG/1
25 TABLET ORAL EVERY 6 HOURS PRN
Qty: 10 TABLET | Refills: 0 | Status: SHIPPED | OUTPATIENT
Start: 2019-03-06 | End: 2019-03-13 | Stop reason: SDUPTHER

## 2019-03-06 RX ADMIN — PROMETHAZINE HYDROCHLORIDE 25 MG: 25 TABLET ORAL at 16:59

## 2019-03-06 RX ADMIN — ACETAMINOPHEN 1000 MG: 500 TABLET ORAL at 16:59

## 2019-03-06 ASSESSMENT — PAIN DESCRIPTION - FREQUENCY: FREQUENCY: CONTINUOUS

## 2019-03-06 ASSESSMENT — PAIN DESCRIPTION - PROGRESSION: CLINICAL_PROGRESSION: GRADUALLY WORSENING

## 2019-03-06 ASSESSMENT — PAIN DESCRIPTION - ONSET: ONSET: PROGRESSIVE

## 2019-03-06 ASSESSMENT — PAIN SCALES - GENERAL: PAINLEVEL_OUTOF10: 3

## 2019-03-06 ASSESSMENT — PAIN DESCRIPTION - PAIN TYPE: TYPE: OTHER (COMMENT)

## 2019-03-13 ENCOUNTER — OFFICE VISIT (OUTPATIENT)
Dept: FAMILY MEDICINE CLINIC | Age: 54
End: 2019-03-13
Payer: COMMERCIAL

## 2019-03-13 VITALS
BODY MASS INDEX: 27.18 KG/M2 | HEIGHT: 67 IN | TEMPERATURE: 98 F | SYSTOLIC BLOOD PRESSURE: 106 MMHG | RESPIRATION RATE: 20 BRPM | HEART RATE: 94 BPM | WEIGHT: 173.2 LBS | DIASTOLIC BLOOD PRESSURE: 82 MMHG

## 2019-03-13 DIAGNOSIS — R11.0 NAUSEA: ICD-10-CM

## 2019-03-13 DIAGNOSIS — B00.9 RECURRENT HSV (HERPES SIMPLEX VIRUS): ICD-10-CM

## 2019-03-13 DIAGNOSIS — J01.90 ACUTE SINUSITIS, RECURRENCE NOT SPECIFIED, UNSPECIFIED LOCATION: ICD-10-CM

## 2019-03-13 DIAGNOSIS — J40 BRONCHITIS: Primary | ICD-10-CM

## 2019-03-13 PROCEDURE — 99214 OFFICE O/P EST MOD 30 MIN: CPT | Performed by: NURSE PRACTITIONER

## 2019-03-13 RX ORDER — ALBUTEROL SULFATE 90 UG/1
2 AEROSOL, METERED RESPIRATORY (INHALATION) 4 TIMES DAILY PRN
Qty: 1 INHALER | Refills: 0 | Status: SHIPPED | OUTPATIENT
Start: 2019-03-13 | End: 2020-11-05 | Stop reason: SDUPTHER

## 2019-03-13 RX ORDER — DOXYCYCLINE HYCLATE 100 MG
100 TABLET ORAL 2 TIMES DAILY
Qty: 14 TABLET | Refills: 0 | Status: SHIPPED | OUTPATIENT
Start: 2019-03-13 | End: 2019-03-20

## 2019-03-13 RX ORDER — BENZONATATE 100 MG/1
100-200 CAPSULE ORAL 3 TIMES DAILY PRN
Qty: 60 CAPSULE | Refills: 0 | Status: SHIPPED | OUTPATIENT
Start: 2019-03-13 | End: 2019-03-20

## 2019-03-13 RX ORDER — PROMETHAZINE HYDROCHLORIDE 25 MG/1
25 TABLET ORAL EVERY 6 HOURS PRN
Qty: 30 TABLET | Refills: 0 | Status: SHIPPED | OUTPATIENT
Start: 2019-03-13 | End: 2019-03-20

## 2019-03-13 RX ORDER — VALACYCLOVIR HYDROCHLORIDE 500 MG/1
500 TABLET, FILM COATED ORAL DAILY PRN
Qty: 30 TABLET | Refills: 6 | Status: SHIPPED | OUTPATIENT
Start: 2019-03-13 | End: 2020-06-04 | Stop reason: SDUPTHER

## 2019-03-13 ASSESSMENT — ENCOUNTER SYMPTOMS
WHEEZING: 0
CONSTIPATION: 0
DIARRHEA: 0
ABDOMINAL PAIN: 0
SINUS PAIN: 1
COUGH: 1
RHINORRHEA: 1
SHORTNESS OF BREATH: 1
SINUS PRESSURE: 1
VOMITING: 0
CHEST TIGHTNESS: 0
NAUSEA: 1

## 2019-03-13 ASSESSMENT — PATIENT HEALTH QUESTIONNAIRE - PHQ9
1. LITTLE INTEREST OR PLEASURE IN DOING THINGS: 0
SUM OF ALL RESPONSES TO PHQ QUESTIONS 1-9: 0
SUM OF ALL RESPONSES TO PHQ QUESTIONS 1-9: 0
2. FEELING DOWN, DEPRESSED OR HOPELESS: 0
SUM OF ALL RESPONSES TO PHQ9 QUESTIONS 1 & 2: 0

## 2019-03-18 ENCOUNTER — OFFICE VISIT (OUTPATIENT)
Dept: FAMILY MEDICINE CLINIC | Age: 54
End: 2019-03-18
Payer: COMMERCIAL

## 2019-03-18 VITALS
DIASTOLIC BLOOD PRESSURE: 84 MMHG | HEART RATE: 90 BPM | WEIGHT: 181 LBS | BODY MASS INDEX: 28.41 KG/M2 | HEIGHT: 67 IN | RESPIRATION RATE: 18 BRPM | SYSTOLIC BLOOD PRESSURE: 122 MMHG

## 2019-03-18 DIAGNOSIS — K21.9 GASTROESOPHAGEAL REFLUX DISEASE, ESOPHAGITIS PRESENCE NOT SPECIFIED: ICD-10-CM

## 2019-03-18 DIAGNOSIS — J40 BRONCHITIS: ICD-10-CM

## 2019-03-18 DIAGNOSIS — R11.0 NAUSEA: Primary | ICD-10-CM

## 2019-03-18 DIAGNOSIS — J01.90 ACUTE SINUSITIS, RECURRENCE NOT SPECIFIED, UNSPECIFIED LOCATION: ICD-10-CM

## 2019-03-18 PROCEDURE — 99213 OFFICE O/P EST LOW 20 MIN: CPT | Performed by: NURSE PRACTITIONER

## 2019-03-18 RX ORDER — SCOLOPAMINE TRANSDERMAL SYSTEM 1 MG/1
1 PATCH, EXTENDED RELEASE TRANSDERMAL
Qty: 2 PATCH | Refills: 0 | Status: SHIPPED | OUTPATIENT
Start: 2019-03-18 | End: 2020-04-20 | Stop reason: CLARIF

## 2019-03-18 RX ORDER — OMEPRAZOLE 20 MG/1
20 CAPSULE, DELAYED RELEASE ORAL
Qty: 30 CAPSULE | Refills: 0 | Status: SHIPPED | OUTPATIENT
Start: 2019-03-18 | End: 2020-11-05

## 2019-03-18 ASSESSMENT — PATIENT HEALTH QUESTIONNAIRE - PHQ9
2. FEELING DOWN, DEPRESSED OR HOPELESS: 0
SUM OF ALL RESPONSES TO PHQ9 QUESTIONS 1 & 2: 0
1. LITTLE INTEREST OR PLEASURE IN DOING THINGS: 0
SUM OF ALL RESPONSES TO PHQ QUESTIONS 1-9: 0
SUM OF ALL RESPONSES TO PHQ QUESTIONS 1-9: 0

## 2019-03-18 ASSESSMENT — ENCOUNTER SYMPTOMS
DIARRHEA: 1
WHEEZING: 0
CONSTIPATION: 1
SHORTNESS OF BREATH: 0
NAUSEA: 1
VOMITING: 0
ABDOMINAL PAIN: 0
COUGH: 0
CHEST TIGHTNESS: 0

## 2019-04-25 ENCOUNTER — TELEPHONE (OUTPATIENT)
Dept: FAMILY MEDICINE CLINIC | Age: 54
End: 2019-04-25

## 2019-06-08 DIAGNOSIS — R09.81 NASAL CONGESTION: ICD-10-CM

## 2019-06-10 RX ORDER — LORATADINE 10 MG/1
10 TABLET ORAL DAILY
Qty: 30 TABLET | Refills: 1 | Status: SHIPPED | OUTPATIENT
Start: 2019-06-10 | End: 2020-10-30 | Stop reason: SDUPTHER

## 2020-03-16 ENCOUNTER — TELEPHONE (OUTPATIENT)
Dept: FAMILY MEDICINE CLINIC | Age: 55
End: 2020-03-16

## 2020-03-16 RX ORDER — AMOXICILLIN 500 MG/1
500 CAPSULE ORAL 2 TIMES DAILY
Qty: 14 CAPSULE | Refills: 0 | Status: SHIPPED | OUTPATIENT
Start: 2020-03-16 | End: 2020-03-23

## 2020-03-16 NOTE — TELEPHONE ENCOUNTER
Due to length of symptoms, I will go ahead and treat with an antibiotic. Please also take tylenol or ibuprofen PRN for pain. Plenty of rest and fluids. She can also use flonase nasal spray to help her symptoms.

## 2020-03-16 NOTE — TELEPHONE ENCOUNTER
Spoke with Eric Justice, she is aware that a script was sent to the pharmacy and all recommendations. She will call if not improving.

## 2020-03-18 ENCOUNTER — TELEPHONE (OUTPATIENT)
Dept: FAMILY MEDICINE CLINIC | Age: 55
End: 2020-03-18

## 2020-03-18 NOTE — TELEPHONE ENCOUNTER
Pt called c/o worsening SX. She now has bilateral ear pain, sinus pressure/HA with clear mucus, intermittent sore throat, and VALENZUELA. She denies SOB at rest, fever, N/V, or diarrhea. She started AMOX 2 days ago. She has also tried Sudafed and Flonase. Advised pt to continue AB, as it has only been 2 days, stay hydrated. Will forward message to PCP, and blayne back with response. Pt uses [facility]. Please advise.

## 2020-03-31 ENCOUNTER — TELEPHONE (OUTPATIENT)
Dept: FAMILY MEDICINE CLINIC | Age: 55
End: 2020-03-31

## 2020-04-03 ENCOUNTER — OFFICE VISIT (OUTPATIENT)
Dept: PRIMARY CARE CLINIC | Age: 55
End: 2020-04-03
Payer: COMMERCIAL

## 2020-04-03 VITALS — TEMPERATURE: 98.5 F | OXYGEN SATURATION: 98 % | HEART RATE: 81 BPM

## 2020-04-03 PROCEDURE — 99212 OFFICE O/P EST SF 10 MIN: CPT | Performed by: FAMILY MEDICINE

## 2020-04-03 RX ORDER — FLUTICASONE PROPIONATE 50 MCG
2 SPRAY, SUSPENSION (ML) NASAL DAILY
Qty: 1 BOTTLE | Refills: 0 | Status: SHIPPED | OUTPATIENT
Start: 2020-04-03

## 2020-04-03 RX ORDER — AMOXICILLIN AND CLAVULANATE POTASSIUM 875; 125 MG/1; MG/1
1 TABLET, FILM COATED ORAL 2 TIMES DAILY
Qty: 20 TABLET | Refills: 0 | Status: SHIPPED | OUTPATIENT
Start: 2020-04-03 | End: 2020-04-13

## 2020-04-03 NOTE — PROGRESS NOTES
4/3/20  Radhika Cortés  1965    FLU/COVID-19 CLINIC EVALUATION    HPI SYMPTOMS: This 48 Yo f here for persistent sinus pressure, drainage, ear pain, +Cough off and on-dry and she can feel some Sob with coughing. No Cp, slight headache-Not WHOL, very little diarrhea. No fever or body aches. Pt used Amoxicillin but no help. Sl tired    [] Fevers  [x] Chills  [x] Cough  [] Coughing up blood  [] Chest Congestion  [x] Nasal Congestion  [x] Feeling short of breath  [x] Sometimes  [] Frequently  [] All the time  [] Chest pain  [x] Headaches  [x]Tolerable  [] Severe  [] Sore throat  [] Muscle aches  [] Nausea  [] Vomiting  []Unable to keep fluids down  [x] Diarrhea  []Severe    [x] OTHER SYMPTOMS: right otalgia, fatigue      Symptom Duration:   [] 1  [] 2   [] 3   [] 4    [] 5   [] 6   [] 7   [] 8   [] 9   [] 10   [] 11   [] 12   [] 13   [] 14   [x] Longer than 14 days    Symptom course:   [] Worsening     [x] Stable     [] Improving    RISK FACTORS:    [] Pregnant or possibly pregnant  [] Age over 61  [] Diabetes  [] Heart disease  [] Asthma  [] COPD/Other chronic lung diseases  [] Active Cancer  [] On Chemotherapy  [] Taking oral steroids  [] History Lymphoma/Leukemia  [] Close contact with a lab confirmed COVID-19 patient within 14 days of symptom onset  [] History of travel from affected geographical areas within 14 days of symptom onset       VITALS:  Vitals:    04/03/20 1645   Pulse: 81   Temp: 98.5 °F (36.9 °C)   SpO2: 98%   Alert. NAD  HEENT: TM intact, nares +rhinorrhea and sinus pressure, throat clear  Neck: Supple  CV- RRR  Lungs: CTA B/L  Ab- Soft NTTP  Ex- No edema  Neuro- CN intact. Gait normal    TESTS:    POCT FLU:  [] Positive     []Negative    ASSESSMENT:  Acute Sinusitis  Cough  R Otalgia    [] Flu  [] Possible COVID-19  [] Strep    PLAN:  Declines CXR or any other testing  Start Augmentin.  Pt wants to use  ADR's explained  Worse to ER    [] Discharge home with written instructions for:  [] Flu management  [] Possible COVID-19 infection with self-quarantine and management of symptoms  [x] Follow-up with primary care physician or emergency department if worsens  [] Evaluation per physician/nurse practitioner in clinic  [] Sent to ER       An  electronic signature was used to authenticate this note. --Scott Blanton,  on 4/3/2020 at 4:48 PM  Advance Care Planning  People with COVID-19 may have no symptoms, mild symptoms, such as fever, cough, and shortness of breath or they may have more severe illness, developing severe and fatal pneumonia. As a result, Advance Care Planning with attention to naming a health care decision maker (someone you trust to make healthcare decisions for you if you could not speak for yourself) and sharing other health care preferences is important BEFORE a possible health crisis. Please contact your Primary Care Provider to discuss Advance Care Planning. (    Preventing the Spread of Coronavirus Disease 2019 in Homes and Residential Communities  For the most recent information go to [employer]    Prevention steps for People with confirmed or suspected COVID-19 (including persons under investigation) who do not need to be hospitalized  and   People with confirmed COVID-19 who were hospitalized and determined to be medically stable to go home    Your healthcare provider and public health staff will evaluate whether you can be cared for at home. If it is determined that you do not need to be hospitalized and can be isolated at home, you will be monitored by staff from your local or state health department. You should follow the prevention steps below until a healthcare provider or local or state health department says you can return to your normal activities. Stay home except to get medical care  People who are mildly ill with COVID-19 are able to isolate at home during their illness.  You should restrict activities with soap and water for at least 20 seconds or, if soap and water are not available, clean your hands with an alcohol-based hand  that contains at least 60% alcohol. Clean your hands often  Wash your hands often with soap and water for at least 20 seconds, especially after blowing your nose, coughing, or sneezing; going to the bathroom; and before eating or preparing food. If soap and water are not readily available, use an alcohol-based hand  with at least 60% alcohol, covering all surfaces of your hands and rubbing them together until they feel dry. Soap and water are the best option if hands are visibly dirty. Avoid touching your eyes, nose, and mouth with unwashed hands. Avoid sharing personal household items  You should not share dishes, drinking glasses, cups, eating utensils, towels, or bedding with other people or pets in your home. After using these items, they should be washed thoroughly with soap and water. Clean all high-touch surfaces everyday  High touch surfaces include counters, tabletops, doorknobs, bathroom fixtures, toilets, phones, keyboards, tablets, and bedside tables. Also, clean any surfaces that may have blood, stool, or body fluids on them. Use a household cleaning spray or wipe, according to the label instructions. Labels contain instructions for safe and effective use of the cleaning product including precautions you should take when applying the product, such as wearing gloves and making sure you have good ventilation during use of the product. Monitor your symptoms  Seek prompt medical attention if your illness is worsening (e.g., difficulty breathing). Before seeking care, call your healthcare provider and tell them that you have, or are being evaluated for, COVID-19. Put on a facemask before you enter the facility. These steps will help the healthcare providers office to keep other people in the office or waiting room from getting infected or exposed.  Ask your healthcare provider to call the local or state health department. Persons who are placed under active monitoring or facilitated self-monitoring should follow instructions provided by their local health department or occupational health professionals, as appropriate. When working with your local health department check their available hours. If you have a medical emergency and need to call 911, notify the dispatch personnel that you have, or are being evaluated for COVID-19. If possible, put on a facemask before emergency medical services arrive. Discontinuing home isolation  Patients with confirmed COVID-19 should remain under home isolation precautions until the risk of secondary transmission to others is thought to be low. The decision to discontinue home isolation precautions should be made on a case-by-case basis, in consultation with healthcare providers and state and local health departments.

## 2020-04-03 NOTE — PATIENT INSTRUCTIONS
Your medication has been sent in to the pharmacy    If worse--Fever, shortness of breath etc..-To ER    Follow up with PCP

## 2020-04-20 ENCOUNTER — VIRTUAL VISIT (OUTPATIENT)
Dept: FAMILY MEDICINE CLINIC | Age: 55
End: 2020-04-20
Payer: COMMERCIAL

## 2020-04-20 PROCEDURE — 99442 PR PHYS/QHP TELEPHONE EVALUATION 11-20 MIN: CPT | Performed by: NURSE PRACTITIONER

## 2020-04-20 NOTE — PROGRESS NOTES
Tyler Monroy is a 47 y.o. female evaluated via telephone on 4/20/2020. Originally scheduled as a virtual visit, but due to technical issues transferred to telephone visit. Approximately 15 minutes attempted troubleshooting technical issues on video visit prior to changing to telephone visit. Consent:  She and/or health care decision maker is aware that that she may receive a bill for this telephone service, depending on her insurance coverage, and has provided verbal consent to proceed: Yes    Patient states that she was seen at the flu clinic as directed and put on augmentin. She completed the augmentin a week ago, but continues to have \"so much pressure in my ears\". \"sometimes it feels like there is something in there trying to crawl out\". Symptoms in both ears. She feels as though her lymph nodes are swollen. She has been on two rounds of antibiotics with no improvement in her ears. Her couch has improved, but she still has a slight cough. She still has a sore throat. Complains of arthralgias in multiple joints that have started to improve. Feels as though hands swollen on occasion. Feet will get cold \"wird feeling, like not all of my foot is cold\". Denies numbness, tingling or changes to skin color. Complains of fatigue that has remained constant. Intermittent dizziness that has improved since onset--primarily occurs with moving head or changing position. Intermittent changes in appetite. Denies SOB, wheezing, n/v/d, abdominal pain. Documentation:  I communicated with the patient and/or health care decision maker about below  Details of this discussion including any medical advice provided: see below     1. Arthralgia, unspecified joint  Discussed need for labs. Patient will have completed in the next 24 hours. ?reactive arthritis? Discussed possible need for referral to rheumatology if not improving.   - CBC Auto Differential; Future  - Comprehensive Metabolic Panel;  Future  - TSH with Reflex; Future  - C-REACTIVE PROTEIN; Future  - SEDIMENTATION RATE; Future    2. Sensation of fullness in both ears  Has been on two rounds of antibiotics plus flonase without improvement. Referred to ENT for further evaluation.   - Amb External Referral To ENT    3. Lipid screening  - Lipid Panel; Future    4. Fatigue, unspecified type  Labs as ordered. - CBC Auto Differential; Future  - Comprehensive Metabolic Panel; Future      Discussed signs/symptoms that require urgent/emergent medical attention. I affirm this is a Patient Initiated Episode with an Established Patient who has not had a related appointment within my department in the past 7 days or scheduled within the next 24 hours.     Total Time: minutes: 11-20 minutes    Note: not billable if this call serves to triage the patient into an appointment for the relevant concern      Shelby Gonsalez

## 2020-04-22 ENCOUNTER — HOSPITAL ENCOUNTER (OUTPATIENT)
Age: 55
Discharge: HOME | End: 2020-04-22
Payer: COMMERCIAL

## 2020-04-22 LAB
ALBUMIN SERPL-MCNC: 4.3 GM/DL (ref 3.4–5)
ALP BLD-CCNC: 84 IU/L (ref 40–129)
ALT SERPL-CCNC: 11 U/L (ref 10–40)
ANION GAP SERPL CALCULATED.3IONS-SCNC: 4 MMOL/L (ref 4–16)
AST SERPL-CCNC: 13 IU/L (ref 15–37)
BASOPHILS ABSOLUTE: 0.1 K/CU MM
BASOPHILS RELATIVE PERCENT: 0.6 % (ref 0–1)
BILIRUB SERPL-MCNC: 0.2 MG/DL (ref 0–1)
BUN BLDV-MCNC: 11 MG/DL (ref 6–23)
CALCIUM SERPL-MCNC: 9.7 MG/DL (ref 8.3–10.6)
CHLORIDE BLD-SCNC: 101 MMOL/L (ref 99–110)
CHOLESTEROL: 156 MG/DL
CO2: 33 MMOL/L (ref 21–32)
CREAT SERPL-MCNC: 0.7 MG/DL (ref 0.6–1.1)
DIFFERENTIAL TYPE: ABNORMAL
EOSINOPHILS ABSOLUTE: 0.1 K/CU MM
EOSINOPHILS RELATIVE PERCENT: 1.8 % (ref 0–3)
ERYTHROCYTE SEDIMENTATION RATE: 3 MM/HR (ref 0–30)
GFR AFRICAN AMERICAN: >60 ML/MIN/1.73M2
GFR NON-AFRICAN AMERICAN: >60 ML/MIN/1.73M2
GLUCOSE BLD-MCNC: 98 MG/DL (ref 70–99)
HCT VFR BLD CALC: 51.7 % (ref 37–47)
HDLC SERPL-MCNC: 50 MG/DL
HEMOGLOBIN: 16.2 GM/DL (ref 12.5–16)
HIGH SENSITIVE C-REACTIVE PROTEIN: 0.2 MG/L
IMMATURE NEUTROPHIL %: 0.3 % (ref 0–0.43)
LDL CHOLESTEROL DIRECT: 98 MG/DL
LYMPHOCYTES ABSOLUTE: 2.3 K/CU MM
LYMPHOCYTES RELATIVE PERCENT: 29.2 % (ref 24–44)
MCH RBC QN AUTO: 28.9 PG (ref 27–31)
MCHC RBC AUTO-ENTMCNC: 31.3 % (ref 32–36)
MCV RBC AUTO: 92.2 FL (ref 78–100)
MONOCYTES ABSOLUTE: 0.6 K/CU MM
MONOCYTES RELATIVE PERCENT: 7.6 % (ref 0–4)
PDW BLD-RTO: 13.4 % (ref 11.7–14.9)
PLATELET # BLD: 282 K/CU MM (ref 140–440)
PMV BLD AUTO: 9.9 FL (ref 7.5–11.1)
POTASSIUM SERPL-SCNC: 4.5 MMOL/L (ref 3.5–5.1)
RBC # BLD: 5.61 M/CU MM (ref 4.2–5.4)
SEGMENTED NEUTROPHILS ABSOLUTE COUNT: 4.7 K/CU MM
SEGMENTED NEUTROPHILS RELATIVE PERCENT: 60.5 % (ref 36–66)
SODIUM BLD-SCNC: 138 MMOL/L (ref 135–145)
T4 FREE: 1.32 NG/DL (ref 0.9–1.8)
TOTAL IMMATURE NEUTOROPHIL: 0.02 K/CU MM
TOTAL PROTEIN: 6.9 GM/DL (ref 6.4–8.2)
TRIGL SERPL-MCNC: 75 MG/DL
TSH HIGH SENSITIVITY: 1.38 UIU/ML (ref 0.27–4.2)
WBC # BLD: 7.8 K/CU MM (ref 4–10.5)

## 2020-04-22 PROCEDURE — 83721 ASSAY OF BLOOD LIPOPROTEIN: CPT

## 2020-04-22 PROCEDURE — 80053 COMPREHEN METABOLIC PANEL: CPT

## 2020-04-22 PROCEDURE — 84439 ASSAY OF FREE THYROXINE: CPT

## 2020-04-22 PROCEDURE — 84443 ASSAY THYROID STIM HORMONE: CPT

## 2020-04-22 PROCEDURE — 86141 C-REACTIVE PROTEIN HS: CPT

## 2020-04-22 PROCEDURE — 36415 COLL VENOUS BLD VENIPUNCTURE: CPT

## 2020-04-22 PROCEDURE — 80061 LIPID PANEL: CPT

## 2020-04-22 PROCEDURE — 85652 RBC SED RATE AUTOMATED: CPT

## 2020-04-22 PROCEDURE — 85025 COMPLETE CBC W/AUTO DIFF WBC: CPT

## 2020-04-28 ENCOUNTER — HOSPITAL ENCOUNTER (OUTPATIENT)
Age: 55
Setting detail: SPECIMEN
Discharge: HOME | End: 2020-04-28
Payer: COMMERCIAL

## 2020-04-28 ENCOUNTER — OFFICE VISIT (OUTPATIENT)
Dept: PRIMARY CARE CLINIC | Age: 55
End: 2020-04-28
Payer: COMMERCIAL

## 2020-04-28 VITALS — HEART RATE: 88 BPM | OXYGEN SATURATION: 97 % | TEMPERATURE: 98.6 F

## 2020-04-28 PROCEDURE — U0002 COVID-19 LAB TEST NON-CDC: HCPCS

## 2020-04-28 PROCEDURE — 99212 OFFICE O/P EST SF 10 MIN: CPT | Performed by: FAMILY MEDICINE

## 2020-04-28 NOTE — PATIENT INSTRUCTIONS
Your COVID 23 lab has been sent in to [facility]. It may take 4 to 10 days for the results    Self quarantine until you know the results    If worse- Go to the ER  Follow up with primary doctor    Adi Ortiz Gema with COVID-19 may have no symptoms, mild symptoms, such as fever, cough, and shortness of breath or they may have more severe illness, developing severe and fatal pneumonia. As a result, Advance Care Planning with attention to naming a health care decision maker (someone you trust to make healthcare decisions for you if you could not speak for yourself) and sharing other health care preferences is important BEFORE a possible health crisis. Please contact your Primary Care Provider to discuss Advance Care Planning. Preventing the Spread of Coronavirus Disease 2019 in Homes and Residential Communities  For the most recent information go to [email or web address]    Prevention steps for People with confirmed or suspected COVID-19 (including persons under investigation) who do not need to be hospitalized  and   People with confirmed COVID-19 who were hospitalized and determined to be medically stable to go home    Your healthcare provider and public health staff will evaluate whether you can be cared for at home. If it is determined that you do not need to be hospitalized and can be isolated at home, you will be monitored by staff from your local or state health department. You should follow the prevention steps below until a healthcare provider or local or state health department says you can return to your normal activities. Stay home except to get medical care  People who are mildly ill with COVID-19 are able to isolate at home during their illness. You should restrict activities outside your home, except for getting medical care. Do not go to work, school, or public areas. Avoid using public transportation, ride-sharing, or taxis.   Separate yourself from other people and animals in your home  People: As much as possible, you should stay in a specific room and away from other people in your home. Also, you should use a separate bathroom, if available. Animals: You should restrict contact with pets and other animals while you are sick with COVID-19, just like you would around other people. Although there have not been reports of pets or other animals becoming sick with COVID-19, it is still recommended that people sick with COVID-19 limit contact with animals until more information is known about the virus. When possible, have another member of your household care for your animals while you are sick. If you are sick with COVID-19, avoid contact with your pet, including petting, snuggling, being kissed or licked, and sharing food. If you must care for your pet or be around animals while you are sick, wash your hands before and after you interact with pets and wear a facemask. Call ahead before visiting your doctor  If you have a medical appointment, call the healthcare provider and tell them that you have or may have COVID-19. This will help the healthcare providers office take steps to keep other people from getting infected or exposed. Wear a facemask  You should wear a facemask when you are around other people (e.g., sharing a room or vehicle) or pets and before you enter a healthcare providers office. If you are not able to wear a facemask (for example, because it causes trouble breathing), then people who live with you should not stay in the same room with you, or they should wear a facemask if they enter your room. Cover your coughs and sneezes  Cover your mouth and nose with a tissue when you cough or sneeze. Throw used tissues in a lined trash can.  Immediately wash your hands with soap and water for at least 20 seconds or, if soap and water are not available, clean your hands with an alcohol-based hand  that contains at least 60% alcohol. Clean your hands often  Wash your hands often with soap and water for at least 20 seconds, especially after blowing your nose, coughing, or sneezing; going to the bathroom; and before eating or preparing food. If soap and water are not readily available, use an alcohol-based hand  with at least 60% alcohol, covering all surfaces of your hands and rubbing them together until they feel dry. Soap and water are the best option if hands are visibly dirty. Avoid touching your eyes, nose, and mouth with unwashed hands. Avoid sharing personal household items  You should not share dishes, drinking glasses, cups, eating utensils, towels, or bedding with other people or pets in your home. After using these items, they should be washed thoroughly with soap and water. Clean all high-touch surfaces everyday  High touch surfaces include counters, tabletops, doorknobs, bathroom fixtures, toilets, phones, keyboards, tablets, and bedside tables. Also, clean any surfaces that may have blood, stool, or body fluids on them. Use a household cleaning spray or wipe, according to the label instructions. Labels contain instructions for safe and effective use of the cleaning product including precautions you should take when applying the product, such as wearing gloves and making sure you have good ventilation during use of the product. Monitor your symptoms  Seek prompt medical attention if your illness is worsening (e.g., difficulty breathing). Before seeking care, call your healthcare provider and tell them that you have, or are being evaluated for, COVID-19. Put on a facemask before you enter the facility. These steps will help the healthcare providers office to keep other people in the office or waiting room from getting infected or exposed. Ask your healthcare provider to call the local or state health department.  Persons who are placed under active monitoring or facilitated self-monitoring should follow

## 2020-04-30 LAB
SARS-COV-2: NOT DETECTED
SOURCE: NORMAL

## 2020-05-06 DIAGNOSIS — R53.83 FATIGUE, UNSPECIFIED TYPE: ICD-10-CM

## 2020-05-06 LAB
HCT VFR BLD CALC: 46.6 % (ref 36–48)
HEMOGLOBIN: 15.4 G/DL (ref 12–16)
MCH RBC QN AUTO: 29.6 PG (ref 26–34)
MCHC RBC AUTO-ENTMCNC: 33 G/DL (ref 31–36)
MCV RBC AUTO: 89.8 FL (ref 80–100)
PDW BLD-RTO: 13.6 % (ref 12.4–15.4)
PLATELET # BLD: 270 K/UL (ref 135–450)
PMV BLD AUTO: 9.4 FL (ref 5–10.5)
RBC # BLD: 5.19 M/UL (ref 4–5.2)
WBC # BLD: 7.7 K/UL (ref 4–11)

## 2020-06-04 ENCOUNTER — OFFICE VISIT (OUTPATIENT)
Dept: FAMILY MEDICINE CLINIC | Age: 55
End: 2020-06-04
Payer: COMMERCIAL

## 2020-06-04 VITALS
DIASTOLIC BLOOD PRESSURE: 70 MMHG | RESPIRATION RATE: 20 BRPM | SYSTOLIC BLOOD PRESSURE: 102 MMHG | HEIGHT: 67 IN | OXYGEN SATURATION: 97 % | HEART RATE: 77 BPM | WEIGHT: 160.2 LBS | BODY MASS INDEX: 25.15 KG/M2 | TEMPERATURE: 98.1 F

## 2020-06-04 PROBLEM — B00.1 COLD SORE: Status: ACTIVE | Noted: 2020-06-04

## 2020-06-04 PROCEDURE — 99213 OFFICE O/P EST LOW 20 MIN: CPT | Performed by: NURSE PRACTITIONER

## 2020-06-04 RX ORDER — AMOXICILLIN AND CLAVULANATE POTASSIUM 875; 125 MG/1; MG/1
1 TABLET, FILM COATED ORAL 2 TIMES DAILY
Qty: 14 TABLET | Refills: 0 | Status: SHIPPED | OUTPATIENT
Start: 2020-06-04 | End: 2020-06-11

## 2020-06-04 RX ORDER — VALACYCLOVIR HYDROCHLORIDE 1 G/1
2000 TABLET, FILM COATED ORAL EVERY 12 HOURS
Qty: 12 TABLET | Refills: 1 | Status: SHIPPED | OUTPATIENT
Start: 2020-06-04 | End: 2021-03-04 | Stop reason: SDUPTHER

## 2020-06-04 ASSESSMENT — ENCOUNTER SYMPTOMS
WHEEZING: 0
ABDOMINAL PAIN: 0
VOMITING: 0
NAUSEA: 0
SHORTNESS OF BREATH: 0
COUGH: 0
CHEST TIGHTNESS: 0
CONSTIPATION: 0
COLOR CHANGE: 1
DIARRHEA: 0

## 2020-06-04 ASSESSMENT — PATIENT HEALTH QUESTIONNAIRE - PHQ9
SUM OF ALL RESPONSES TO PHQ QUESTIONS 1-9: 1
SUM OF ALL RESPONSES TO PHQ9 QUESTIONS 1 & 2: 1
1. LITTLE INTEREST OR PLEASURE IN DOING THINGS: 1
2. FEELING DOWN, DEPRESSED OR HOPELESS: 0
SUM OF ALL RESPONSES TO PHQ QUESTIONS 1-9: 1

## 2020-06-09 ENCOUNTER — HOSPITAL ENCOUNTER (OUTPATIENT)
Age: 55
Discharge: HOME | End: 2020-06-09
Payer: COMMERCIAL

## 2020-06-09 ENCOUNTER — HOSPITAL ENCOUNTER (OUTPATIENT)
Dept: GENERAL RADIOLOGY | Age: 55
Discharge: HOME | End: 2020-06-09
Payer: COMMERCIAL

## 2020-06-09 PROCEDURE — 73140 X-RAY EXAM OF FINGER(S): CPT

## 2020-06-24 ENCOUNTER — VIRTUAL VISIT (OUTPATIENT)
Dept: FAMILY MEDICINE CLINIC | Age: 55
End: 2020-06-24
Payer: COMMERCIAL

## 2020-06-24 PROCEDURE — 99213 OFFICE O/P EST LOW 20 MIN: CPT | Performed by: NURSE PRACTITIONER

## 2020-06-24 RX ORDER — AZELASTINE 1 MG/ML
1 SPRAY, METERED NASAL 2 TIMES DAILY
Qty: 1 BOTTLE | Refills: 0 | Status: SHIPPED | OUTPATIENT
Start: 2020-06-24

## 2020-06-24 ASSESSMENT — ENCOUNTER SYMPTOMS
SORE THROAT: 1
WHEEZING: 0
ABDOMINAL PAIN: 0
CHEST TIGHTNESS: 0
CONSTIPATION: 0
NAUSEA: 0
DIARRHEA: 0
COUGH: 1
RHINORRHEA: 1
VOMITING: 0
SHORTNESS OF BREATH: 0

## 2020-06-24 NOTE — PROGRESS NOTES
2020    TELEHEALTH EVALUATION -- Audio/Visual (During THYFJ-49 public health emergency)    Due to COVID-19 related state of emergency restrictions , as an alternative to an in-person session, the clinical decision was made to utilize a virtual visit to provide services for this patient's visit. These services were provided via [employer]. me with the patient in their home while I was located at [place] [address] and [facility]. Identity was confirmed via patient name and . Verbal consent for use of telehealth was provided to and completed by the patient. HPI:    Tyler Monroy (:  1965) has requested an audio/video evaluation for the following concern(s):    Chief Complaint   Patient presents with    Ear Fullness    Pharyngitis    Fatigue       Symptom onset 2 days ago. Complains of fatigue, sore throat, pressure in ears, dry cough, postnasal drainage, rhinorrhea, congestion  Denies fever, SOB, wheezing  She has been taking claritin, flonase inconsistently. Claritin has helped slightly. She did not get in with ENT. Fatigue has remained since she was sick in March. Tobacco abuse  Smoking 1.5 packs a day for the last 20-25 years. Not ready to quit smoking. Review of Systems   Constitutional: Positive for fatigue. Negative for appetite change, chills, fever and unexpected weight change. HENT: Positive for congestion, ear pain (fullness), postnasal drip, rhinorrhea and sore throat. Respiratory: Positive for cough. Negative for chest tightness, shortness of breath and wheezing. Cardiovascular: Negative for chest pain, palpitations and leg swelling. Gastrointestinal: Negative for abdominal pain, constipation, diarrhea, nausea and vomiting. Musculoskeletal: Negative for arthralgias. Neurological: Negative for weakness, numbness and headaches. Hematological: Negative for adenopathy. Prior to Visit Medications    Medication Sig Taking?  Authorizing Provider azelastine (ASTELIN) 0.1 % nasal spray 1 spray by Nasal route 2 times daily Use in each nostril as directed Yes SHEREE Winters CNP   fluticasone (FLONASE) 50 MCG/ACT nasal spray 2 sprays by Each Nostril route daily  Higinio Claire DO   loratadine (CLARITIN) 10 MG tablet TAKE 1 TABLET BY MOUTH DAILY  SHEREE Winters CNP   Calcium Carbonate-Simethicone (ML-SELTZER HEARTBURN + GAS PO) Take by mouth  Historical Provider, MD   omeprazole (PRILOSEC) 20 MG delayed release capsule Take 1 capsule by mouth every morning (before breakfast)  Patient not taking: Reported on 6/4/2020  SHEREE Winters CNP   albuterol sulfate  (90 Base) MCG/ACT inhaler Inhale 2 puffs into the lungs 4 times daily as needed for Wheezing  SHEREE Winters CNP   Naproxen Sodium (ALEVE) 220 MG CAPS Take 1 tablet by mouth as needed for Pain. Historical Provider, MD   Multiple Vitamin (MULTIVITAMIN PO) Take 1 tablet by mouth daily.   Historical Provider, MD       Allergies   Allergen Reactions    Codeine      Stomach cramps    Tramadol     Zofran [Ondansetron Hcl] Other (See Comments)     Severe abd pain       Social History     Tobacco Use    Smoking status: Current Every Day Smoker     Packs/day: 1.50     Years: 25.00     Pack years: 37.50     Start date: 12/19/1980    Smokeless tobacco: Never Used   Substance Use Topics    Alcohol use: Yes     Comment: occassionally    Drug use: No      Past Medical History:   Diagnosis Date    Dental abscess 2015    [facility]    Pneumonia 1988    Psoriasis     Spondylolysis 9-10-12    Bilateral L5 pars defects, CT done 9-10-12 [facility]     Past Surgical History:   Procedure Laterality Date    CHOLECYSTECTOMY  1988    gall stones    ECTOPIC PREGNANCY SURGERY  1996    HYSTERECTOMY  2004    benign tumor right ovary    HYSTERECTOMY, VAGINAL      TUBAL LIGATION  1993         PHYSICAL EXAMINATION:    Vital Signs: (As obtained by patient/caregiver

## 2020-06-25 ENCOUNTER — HOSPITAL ENCOUNTER (OUTPATIENT)
Age: 55
Discharge: HOME | End: 2020-06-25
Payer: COMMERCIAL

## 2020-06-25 LAB
ALBUMIN SERPL-MCNC: 4.4 GM/DL (ref 3.4–5)
ALP BLD-CCNC: 101 IU/L (ref 40–129)
ALT SERPL-CCNC: 30 U/L (ref 10–40)
ANION GAP SERPL CALCULATED.3IONS-SCNC: 7 MMOL/L (ref 4–16)
AST SERPL-CCNC: 24 IU/L (ref 15–37)
BASOPHILS ABSOLUTE: 0.1 K/CU MM
BASOPHILS RELATIVE PERCENT: 0.8 % (ref 0–1)
BILIRUB SERPL-MCNC: 0.3 MG/DL (ref 0–1)
BUN BLDV-MCNC: 8 MG/DL (ref 6–23)
CALCIUM SERPL-MCNC: 9.9 MG/DL (ref 8.3–10.6)
CHLORIDE BLD-SCNC: 101 MMOL/L (ref 99–110)
CO2: 33 MMOL/L (ref 21–32)
CREAT SERPL-MCNC: 0.8 MG/DL (ref 0.6–1.1)
DIFFERENTIAL TYPE: ABNORMAL
EOSINOPHILS ABSOLUTE: 0.2 K/CU MM
EOSINOPHILS RELATIVE PERCENT: 2.1 % (ref 0–3)
GFR AFRICAN AMERICAN: >60 ML/MIN/1.73M2
GFR NON-AFRICAN AMERICAN: >60 ML/MIN/1.73M2
GLUCOSE FASTING: 103 MG/DL (ref 70–99)
HCT VFR BLD CALC: 51.4 % (ref 37–47)
HEMOGLOBIN: 16.2 GM/DL (ref 12.5–16)
IMMATURE NEUTROPHIL %: 0.4 % (ref 0–0.43)
LYMPHOCYTES ABSOLUTE: 3.3 K/CU MM
LYMPHOCYTES RELATIVE PERCENT: 39.1 % (ref 24–44)
MCH RBC QN AUTO: 28.3 PG (ref 27–31)
MCHC RBC AUTO-ENTMCNC: 31.5 % (ref 32–36)
MCV RBC AUTO: 89.9 FL (ref 78–100)
MONOCYTES ABSOLUTE: 0.7 K/CU MM
MONOCYTES RELATIVE PERCENT: 8.4 % (ref 0–4)
PDW BLD-RTO: 13.9 % (ref 11.7–14.9)
PLATELET # BLD: 298 K/CU MM (ref 140–440)
PMV BLD AUTO: 10.1 FL (ref 7.5–11.1)
POTASSIUM SERPL-SCNC: 4.9 MMOL/L (ref 3.5–5.1)
RBC # BLD: 5.72 M/CU MM (ref 4.2–5.4)
SEGMENTED NEUTROPHILS ABSOLUTE COUNT: 4.1 K/CU MM
SEGMENTED NEUTROPHILS RELATIVE PERCENT: 49.2 % (ref 36–66)
SODIUM BLD-SCNC: 141 MMOL/L (ref 135–145)
T4 FREE: 1.15 NG/DL (ref 0.9–1.8)
TOTAL IMMATURE NEUTOROPHIL: 0.03 K/CU MM
TOTAL PROTEIN: 7.3 GM/DL (ref 6.4–8.2)
TSH HIGH SENSITIVITY: 1.61 UIU/ML (ref 0.27–4.2)
WBC # BLD: 8.4 K/CU MM (ref 4–10.5)

## 2020-06-25 PROCEDURE — 85025 COMPLETE CBC W/AUTO DIFF WBC: CPT

## 2020-06-25 PROCEDURE — 84443 ASSAY THYROID STIM HORMONE: CPT

## 2020-06-25 PROCEDURE — 36415 COLL VENOUS BLD VENIPUNCTURE: CPT

## 2020-06-25 PROCEDURE — 80053 COMPREHEN METABOLIC PANEL: CPT

## 2020-06-25 PROCEDURE — 84439 ASSAY OF FREE THYROXINE: CPT

## 2020-06-30 ENCOUNTER — TELEPHONE (OUTPATIENT)
Dept: FAMILY MEDICINE CLINIC | Age: 55
End: 2020-06-30

## 2020-06-30 NOTE — TELEPHONE ENCOUNTER
Left message telling the patient she is due for a mammogram and  all information about the mobile mammogram that is coming to the health dept. on July 9th, 2020 so she can schedule if she would like.

## 2020-11-02 RX ORDER — LORATADINE 10 MG/1
10 TABLET ORAL DAILY
Qty: 30 TABLET | Refills: 1 | Status: SHIPPED | OUTPATIENT
Start: 2020-11-02 | End: 2020-11-05 | Stop reason: SDUPTHER

## 2020-11-05 ENCOUNTER — VIRTUAL VISIT (OUTPATIENT)
Dept: FAMILY MEDICINE CLINIC | Age: 55
End: 2020-11-05
Payer: COMMERCIAL

## 2020-11-05 PROCEDURE — 99213 OFFICE O/P EST LOW 20 MIN: CPT | Performed by: NURSE PRACTITIONER

## 2020-11-05 RX ORDER — LORATADINE 10 MG/1
10 TABLET ORAL DAILY
Qty: 30 TABLET | Refills: 1 | Status: SHIPPED | OUTPATIENT
Start: 2020-11-05 | End: 2021-03-26 | Stop reason: SDUPTHER

## 2020-11-05 RX ORDER — AMOXICILLIN 875 MG/1
875 TABLET, COATED ORAL 2 TIMES DAILY
Qty: 20 TABLET | Refills: 0 | Status: SHIPPED | OUTPATIENT
Start: 2020-11-05 | End: 2020-11-15

## 2020-11-05 RX ORDER — ALBUTEROL SULFATE 90 UG/1
2 AEROSOL, METERED RESPIRATORY (INHALATION) 4 TIMES DAILY PRN
Qty: 1 INHALER | Refills: 0 | Status: SHIPPED | OUTPATIENT
Start: 2020-11-05 | End: 2020-12-28

## 2020-11-05 ASSESSMENT — ENCOUNTER SYMPTOMS
SHORTNESS OF BREATH: 1
COUGH: 1
CHEST TIGHTNESS: 0
SORE THROAT: 1
NAUSEA: 0
ABDOMINAL PAIN: 0
CONSTIPATION: 0
RHINORRHEA: 1
WHEEZING: 0
VOMITING: 0
DIARRHEA: 0

## 2020-11-05 NOTE — PROGRESS NOTES
2020    TELEHEALTH EVALUATION -- Audio/Visual (During WFCDN-71 public health emergency)    Due to COVID-19 related state of emergency restrictions , as an alternative to an in-person session, the clinical decision was made to utilize a virtual visit to provide services for this patient's visit. These services were provided via [employer]. me with the patient in their vehicle while I was located at [place] [address] and [facility]. Identity was confirmed via patient name and . Verbal consent for use of telehealth was provided to and completed by the patient. HPI:    Richard Lewis (:  1965) has requested an audio/video evaluation for the following concern(s):    Chief Complaint   Patient presents with    URI       Symptom onset two weeks ago. Complains of rhinorrhea, congestion, sore throat, ear fullness, cough in the morning, mild SOB relieved with albuterol. Denies fever, wheezing, chest pain, sinus pain/pressure. She has taken mucinex, tylenol cold and flu without relief. She feels her symptoms are gradually improving. she does have a history of recurrent sinus issuess--she has seen ENT once. H/o deviated septum. Admits to continuing to go to work over the last two weeks. States she wears a mask and has plexiglass between the work stations. Educated on importance of contacting health care provider when symptomatic to ensure safe to return to work due to current pandemic. She continues to smoke. Smoking less than half a pack a day. No problem-specific Assessment & Plan notes found for this encounter. Review of Systems   Constitutional: Negative for appetite change, chills, fatigue, fever and unexpected weight change. HENT: Positive for congestion, postnasal drip, rhinorrhea and sore throat. Respiratory: Positive for cough and shortness of breath. Negative for chest tightness and wheezing. Cardiovascular: Negative for chest pain, palpitations and leg swelling. Gastrointestinal: Negative for abdominal pain, constipation, diarrhea, nausea and vomiting. Musculoskeletal: Negative for arthralgias. Neurological: Negative for weakness, numbness and headaches. Hematological: Negative for adenopathy. Prior to Visit Medications    Medication Sig Taking? Authorizing Provider   loratadine (CLARITIN) 10 MG tablet Take 1 tablet by mouth daily Yes SHEREE Wright CNP   albuterol sulfate  (90 Base) MCG/ACT inhaler Inhale 2 puffs into the lungs 4 times daily as needed for Wheezing Yes SHEREE Wright CNP   amoxicillin (AMOXIL) 875 MG tablet Take 1 tablet by mouth 2 times daily for 10 days Yes SHEREE Wright CNP   azelastine (ASTELIN) 0.1 % nasal spray 1 spray by Nasal route 2 times daily Use in each nostril as directed Yes SHEREE Wright CNP   fluticasone (FLONASE) 50 MCG/ACT nasal spray 2 sprays by Each Nostril route daily Yes Lubertha Sin, DO   Naproxen Sodium (ALEVE) 220 MG CAPS Take 1 tablet by mouth as needed for Pain. Yes Historical Provider, MD   Multiple Vitamin (MULTIVITAMIN PO) Take 1 tablet by mouth daily.  Yes Historical Provider, MD       Allergies   Allergen Reactions    Codeine      Stomach cramps    Tramadol     Zofran [Ondansetron Hcl] Other (See Comments)     Severe abd pain       Social History     Tobacco Use    Smoking status: Current Every Day Smoker     Packs/day: 1.50     Years: 25.00     Pack years: 37.50     Start date: 12/19/1980    Smokeless tobacco: Never Used   Substance Use Topics    Alcohol use: Yes     Comment: occassionally    Drug use: No      Past Medical History:   Diagnosis Date    Dental abscess [facility]    Pneumonia 1988    Psoriasis     Spondylolysis 9-10-12    Bilateral L5 pars defects, CT done 9-10-12 [facility]     Past Surgical History:   Procedure Laterality Date    CHOLECYSTECTOMY  1988    gall stones   [address] HYSTERECTOMY  2004    benign tumor right ovary    HYSTERECTOMY, VAGINAL      TUBAL LIGATION  1993         PHYSICAL EXAMINATION:    Vital Signs: (As obtained by patient/caregiver or practitioner observation)    Blood pressure-  Heart rate-    Respiratory rate-    Temperature-  Pulse oximetry-     Physical Exam  Constitutional:       Appearance: Normal appearance. She is not ill-appearing. HENT:      Head: Normocephalic and atraumatic. Right Ear: Hearing and external ear normal.      Left Ear: Hearing and external ear normal.      Mouth/Throat:      Mouth: Mucous membranes are moist.   Eyes:      Extraocular Movements: Extraocular movements intact. Neck:      Musculoskeletal: Normal range of motion. Pulmonary:      Effort: Pulmonary effort is normal.      Comments: No visualized signs of difficulty breathing  Skin:     Comments: No significant exanthematous lesions or discoloration noted on facial skin   Neurological:      Mental Status: She is alert and oriented to person, place, and time. Cranial Nerves: No facial asymmetry. Psychiatric:         Mood and Affect: Mood and affect normal.         Speech: Speech normal.         Behavior: Behavior is cooperative. Thought Content: Thought content normal.         Other pertinent observable physical exam findings-     Due to this being a TeleHealth encounter, evaluation of the following organ systems is limited: Vitals/Constitutional/EENT/Resp/CV/GI//MS/Neuro/Skin/Heme-Lymph-Imm. ASSESSMENT/PLAN:  1. Nasal congestion  claritin refilled  - loratadine (CLARITIN) 10 MG tablet; Take 1 tablet by mouth daily  Dispense: 30 tablet; Refill: 1    2. Acute recurrent sinusitis, unspecified location  Discussed my concern that she continued to go to work without consulting a health care provider first. At this point she is two weeks into her illness and her symptoms are gradually improving. She has remained afebrile. She will be treated with amoxicillin.

## 2020-12-28 RX ORDER — ALBUTEROL SULFATE 90 UG/1
2 AEROSOL, METERED RESPIRATORY (INHALATION) 4 TIMES DAILY PRN
Qty: 6.7 INHALER | Refills: 0 | Status: SHIPPED | OUTPATIENT
Start: 2020-12-28

## 2021-01-07 DIAGNOSIS — Z20.822 CLOSE EXPOSURE TO COVID-19 VIRUS: Primary | ICD-10-CM

## 2021-01-08 ENCOUNTER — HOSPITAL ENCOUNTER (OUTPATIENT)
Age: 56
Discharge: HOME | End: 2021-01-08
Payer: COMMERCIAL

## 2021-01-08 PROCEDURE — C9803 HOPD COVID-19 SPEC COLLECT: HCPCS

## 2021-01-08 PROCEDURE — U0002 COVID-19 LAB TEST NON-CDC: HCPCS

## 2021-01-10 LAB
SARS-COV-2: NOT DETECTED
SOURCE: NORMAL

## 2021-03-04 ENCOUNTER — OFFICE VISIT (OUTPATIENT)
Dept: FAMILY MEDICINE CLINIC | Age: 56
End: 2021-03-04
Payer: COMMERCIAL

## 2021-03-04 VITALS
SYSTOLIC BLOOD PRESSURE: 128 MMHG | TEMPERATURE: 98.6 F | BODY MASS INDEX: 24.9 KG/M2 | WEIGHT: 159 LBS | DIASTOLIC BLOOD PRESSURE: 76 MMHG | HEART RATE: 86 BPM | OXYGEN SATURATION: 97 % | RESPIRATION RATE: 18 BRPM

## 2021-03-04 DIAGNOSIS — L40.9 SCALP PSORIASIS: Primary | ICD-10-CM

## 2021-03-04 DIAGNOSIS — Z72.0 TOBACCO ABUSE: ICD-10-CM

## 2021-03-04 DIAGNOSIS — B00.9 RECURRENT HSV (HERPES SIMPLEX VIRUS): ICD-10-CM

## 2021-03-04 DIAGNOSIS — B00.1 COLD SORE: ICD-10-CM

## 2021-03-04 DIAGNOSIS — M19.90 ARTHRITIS: ICD-10-CM

## 2021-03-04 PROCEDURE — 99213 OFFICE O/P EST LOW 20 MIN: CPT | Performed by: NURSE PRACTITIONER

## 2021-03-04 RX ORDER — CLOBETASOL PROPIONATE 0.05 G/100ML
1 SHAMPOO TOPICAL DAILY PRN
Qty: 1 BOTTLE | Refills: 1 | Status: SHIPPED | OUTPATIENT
Start: 2021-03-04

## 2021-03-04 RX ORDER — VALACYCLOVIR HYDROCHLORIDE 1 G/1
2000 TABLET, FILM COATED ORAL EVERY 12 HOURS
Qty: 12 TABLET | Refills: 5 | Status: SHIPPED | OUTPATIENT
Start: 2021-03-04 | End: 2021-09-14 | Stop reason: SDUPTHER

## 2021-03-04 ASSESSMENT — PATIENT HEALTH QUESTIONNAIRE - PHQ9
2. FEELING DOWN, DEPRESSED OR HOPELESS: 1
SUM OF ALL RESPONSES TO PHQ QUESTIONS 1-9: 2
SUM OF ALL RESPONSES TO PHQ9 QUESTIONS 1 & 2: 2
SUM OF ALL RESPONSES TO PHQ QUESTIONS 1-9: 2

## 2021-03-04 ASSESSMENT — ENCOUNTER SYMPTOMS
VOMITING: 0
NAUSEA: 0
WHEEZING: 0
ABDOMINAL PAIN: 0
COUGH: 0
DIARRHEA: 0
CHEST TIGHTNESS: 0
SHORTNESS OF BREATH: 0
CONSTIPATION: 0

## 2021-03-04 NOTE — PROGRESS NOTES
SUBJECTIVE:    Nando Contreras  1965  54 y.o.  female      Chief Complaint   Patient presents with    Hand Pain     c/o waking up in nthe AM with swollen hands and pain-thinking arthritis-she has been having issues for about 1 yr ago-it's just progressively more and intense     HPI     Complains of pain and swelling in hands when she wakes up in the morning. Noticed approximately one year ago. Denies numbness or tingling. She does use fine motor skills at work. She will soak in warm water with relief. She will take aleve or tylenol with relief. Improves with activity. She does have a history of psoriasis--worse on scalp, under left breast, ears. She has tried OTC shampoo to help with scalp with minimal relief. Complains of seasonal rash on hands that is very pruritic. Allergies   Allergen Reactions    Codeine      Stomach cramps    Tramadol     Zofran [Ondansetron Hcl] Other (See Comments)     Severe abd pain       Current Outpatient Medications   Medication Sig Dispense Refill    valACYclovir (VALTREX) 1 g tablet Take 2 tablets by mouth every 12 hours for 1 day For oral ulcer flares 12 tablet 5    Clobetasol Propionate 0.05 % SHAM Apply 1 Dose topically daily as needed (psoriasis) 1 Bottle 1    albuterol sulfate  (90 Base) MCG/ACT inhaler INHALE 2 PUFFS INTO THE LUNGS 4 TIMES DAILY AS NEEDED FOR WHEEZING 6.7 Inhaler 0    loratadine (CLARITIN) 10 MG tablet Take 1 tablet by mouth daily 30 tablet 1    Multiple Vitamin (MULTIVITAMIN PO) Take 1 tablet by mouth daily.  azelastine (ASTELIN) 0.1 % nasal spray 1 spray by Nasal route 2 times daily Use in each nostril as directed 1 Bottle 0    fluticasone (FLONASE) 50 MCG/ACT nasal spray 2 sprays by Each Nostril route daily 1 Bottle 0    Naproxen Sodium (ALEVE) 220 MG CAPS Take 1 tablet by mouth as needed for Pain. No current facility-administered medications for this visit.            Past Medical History:   Diagnosis Date    Dental abscess 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    HYSTERECTOMY, VAGINAL      TUBAL LIGATION  1993     Social History     Socioeconomic History    Marital status: Single     Spouse name: None    Number of children: None    Years of education: None    Highest education level: None   Occupational History    None   Social Needs    Financial resource strain: None    Food insecurity     Worry: None     Inability: None    Transportation needs     Medical: None     Non-medical: None   Tobacco Use    Smoking status: Current Every Day Smoker     Packs/day: 1.50     Years: 25.00     Pack years: 37.50     Start date: 12/19/1980    Smokeless tobacco: Never Used   Substance and Sexual Activity    Alcohol use: Yes     Comment: occassionally    Drug use: No    Sexual activity: Not Currently   Lifestyle    Physical activity     Days per week: None     Minutes per session: None    Stress: None   Relationships    Social connections     Talks on phone: None     Gets together: None     Attends Jewish service: None     Active member of club or organization: None     Attends meetings of clubs or organizations: None     Relationship status: None    Intimate partner violence     Fear of current or ex partner: None     Emotionally abused: None     Physically abused: None     Forced sexual activity: None   Other Topics Concern    None   Social History Narrative    None         Tobacco abuse  Smoking 0.5-1 pack a day. Not ready to quit. Review of Systems   Constitutional: Negative for appetite change, chills, fatigue and unexpected weight change.    Respiratory: Negative for cough, chest tightness, shortness of breath and wheezing. Cardiovascular: Negative for chest pain, palpitations and leg swelling. Gastrointestinal: Negative for abdominal pain, constipation, diarrhea, nausea and vomiting. Musculoskeletal: Positive for arthralgias. Neurological: Negative for weakness, numbness and headaches. Hematological: Negative for adenopathy. OBJECTIVE:    /76 (Site: Right Upper Arm, Position: Sitting, Cuff Size: Large Adult)   Pulse 86   Temp 98.6 °F (37 °C)   Resp 18   Wt 159 lb (72.1 kg)   SpO2 97%   BMI 24.90 kg/m²     Physical Exam  Constitutional:       Appearance: Normal appearance. She is well-developed. HENT:      Head: Normocephalic and atraumatic. Right Ear: Hearing normal.      Left Ear: Hearing normal.   Neck:      Musculoskeletal: Normal range of motion and neck supple. Cardiovascular:      Rate and Rhythm: Normal rate and regular rhythm. Heart sounds: Normal heart sounds. No murmur. No friction rub. No gallop. Pulmonary:      Effort: Pulmonary effort is normal.      Breath sounds: Normal breath sounds. No wheezing, rhonchi or rales. Abdominal:      Palpations: Abdomen is soft. Musculoskeletal: Normal range of motion. Skin:     General: Skin is warm and dry. Comments: Thickened silver plaque on top and posterior scalp. Neurological:      General: No focal deficit present. Mental Status: She is alert and oriented to person, place, and time. Psychiatric:         Mood and Affect: Mood normal.         Behavior: Behavior normal. Behavior is cooperative. Thought Content: Thought content normal.         ASSESSMENT/PLAN:    1. Recurrent HSV (herpes simplex virus)  - valACYclovir (VALTREX) 1 g tablet; Take 2 tablets by mouth every 12 hours for 1 day For oral ulcer flares  Dispense: 12 tablet; Refill: 5    2. Cold sore  - valACYclovir (VALTREX) 1 g tablet;  Take 2 tablets by mouth every 12 hours for 1 day For oral ulcer flares  Dispense: 12 tablet; Refill: 5    3. Tobacco abuse  Counseled on cessation    4. Scalp psoriasis  Clobetasol as prescribed. Follow up with dermatology  - Clobetasol Propionate 0.05 % SHAM; Apply 1 Dose topically daily as needed (psoriasis)  Dispense: 1 Bottle; Refill: 1  - [facility] - Kell Marrufo MD, Dermatology, [facility]    5. Arthritis  ?psoriatic vs osteo? Discussed lab work up and she prefers to see dermatology first. Continue gentle ROM, NSAID PRN. Return if symptoms worsen or fail to improve.       (Please note that portions of this note may have been completed with a voice recognition program. Efforts were made to edit the dictations but occasionally words aremis-transcribed.)

## 2021-03-24 ENCOUNTER — HOSPITAL ENCOUNTER (OUTPATIENT)
Age: 56
Discharge: HOME | End: 2021-03-24
Payer: COMMERCIAL

## 2021-03-24 ENCOUNTER — TELEPHONE (OUTPATIENT)
Dept: FAMILY MEDICINE CLINIC | Age: 56
End: 2021-03-24

## 2021-03-24 ENCOUNTER — OFFICE VISIT (OUTPATIENT)
Dept: FAMILY MEDICINE CLINIC | Age: 56
End: 2021-03-24
Payer: COMMERCIAL

## 2021-03-24 VITALS
OXYGEN SATURATION: 96 % | SYSTOLIC BLOOD PRESSURE: 100 MMHG | TEMPERATURE: 97.9 F | HEART RATE: 106 BPM | DIASTOLIC BLOOD PRESSURE: 62 MMHG | BODY MASS INDEX: 25.69 KG/M2 | WEIGHT: 164 LBS | RESPIRATION RATE: 16 BRPM

## 2021-03-24 DIAGNOSIS — H93.8X3 SENSATION OF FULLNESS IN BOTH EARS: Primary | ICD-10-CM

## 2021-03-24 DIAGNOSIS — J34.89 SINUS PAIN: ICD-10-CM

## 2021-03-24 PROCEDURE — U0003 INFECTIOUS AGENT DETECTION BY NUCLEIC ACID (DNA OR RNA); SEVERE ACUTE RESPIRATORY SYNDROME CORONAVIRUS 2 (SARS-COV-2) (CORONAVIRUS DISEASE [COVID-19]), AMPLIFIED PROBE TECHNIQUE, MAKING USE OF HIGH THROUGHPUT TECHNOLOGIES AS DESCRIBED BY CMS-2020-01-R: HCPCS

## 2021-03-24 PROCEDURE — 99213 OFFICE O/P EST LOW 20 MIN: CPT | Performed by: NURSE PRACTITIONER

## 2021-03-24 ASSESSMENT — ENCOUNTER SYMPTOMS
RHINORRHEA: 1
SINUS PRESSURE: 1
ABDOMINAL PAIN: 0
NAUSEA: 0
VOMITING: 0
SINUS PAIN: 1
SORE THROAT: 0
CHEST TIGHTNESS: 0
CONSTIPATION: 0
COUGH: 0
DIARRHEA: 0
SHORTNESS OF BREATH: 0
WHEEZING: 0

## 2021-03-24 ASSESSMENT — PATIENT HEALTH QUESTIONNAIRE - PHQ9
SUM OF ALL RESPONSES TO PHQ9 QUESTIONS 1 & 2: 0
SUM OF ALL RESPONSES TO PHQ QUESTIONS 1-9: 0

## 2021-03-24 NOTE — PROGRESS NOTES
SUBJECTIVE:    Hayder Herrmann  1965  54 y.o.  female      Chief Complaint   Patient presents with    Ear Fullness     congestion in ears-static-started sunday-has been really tired since left work sunday    Fatigue    Headache    Head Congestion     HPI     Symptom onset 4 days ago. Complains of static in ears, ear pressure, fatigue, sinus pressure, rhinorrhea \"not any more than normal\", sneezing  No fever, cough, n/v/d. She has used mucinex, aleve. She has inconsistently used her nasal sprays. Symptoms have remained constant. Allergies   Allergen Reactions    Codeine      Stomach cramps    Tramadol     Zofran [Ondansetron Hcl] Other (See Comments)     Severe abd pain       Current Outpatient Medications   Medication Sig Dispense Refill    albuterol sulfate  (90 Base) MCG/ACT inhaler INHALE 2 PUFFS INTO THE LUNGS 4 TIMES DAILY AS NEEDED FOR WHEEZING 6.7 Inhaler 0    loratadine (CLARITIN) 10 MG tablet Take 1 tablet by mouth daily 30 tablet 1    azelastine (ASTELIN) 0.1 % nasal spray 1 spray by Nasal route 2 times daily Use in each nostril as directed 1 Bottle 0    fluticasone (FLONASE) 50 MCG/ACT nasal spray 2 sprays by Each Nostril route daily 1 Bottle 0    Naproxen Sodium (ALEVE) 220 MG CAPS Take 1 tablet by mouth as needed for Pain.  Multiple Vitamin (MULTIVITAMIN PO) Take 1 tablet by mouth daily.  Clobetasol Propionate 0.05 % SHAM Apply 1 Dose topically daily as needed (psoriasis) 1 Bottle 1     No current facility-administered medications for this visit.            Past Medical History:   Diagnosis Date    Dental abscess [facility] ED    Pneumonia 1988    Psoriasis     Spondylolysis 9-10-12    Bilateral L5 pars defects, CT done 9-10-12 [facility]     Past Surgical History:   Procedure Laterality Date    CHOLECYSTECTOMY  1988    gall stones    [address] HYSTERECTOMY  2004    benign tumor right ovary    HYSTERECTOMY, VAGINAL      TUBAL LIGATION  1993     Social History     Socioeconomic History    Marital status: Single     Spouse name: None    Number of children: None    Years of education: None    Highest education level: None   Occupational History    None   Social Needs    Financial resource strain: None    Food insecurity     Worry: None     Inability: None    Transportation needs     Medical: None     Non-medical: None   Tobacco Use    Smoking status: Current Every Day Smoker     Packs/day: 1.50     Years: 25.00     Pack years: 37.50     Start date: 12/19/1980    Smokeless tobacco: Never Used   Substance and Sexual Activity    Alcohol use: Yes     Comment: occassionally    Drug use: No    Sexual activity: Not Currently   Lifestyle    Physical activity     Days per week: None     Minutes per session: None    Stress: None   Relationships    Social connections     Talks on phone: None     Gets together: None     Attends Orthodox service: None     Active member of club or organization: None     Attends meetings of clubs or organizations: None     Relationship status: None    Intimate partner violence     Fear of current or ex partner: None     Emotionally abused: None     Physically abused: None     Forced sexual activity: None   Other Topics Concern    None   Social History Narrative    None         No problem-specific Assessment & Plan notes found for this encounter. Review of Systems   Constitutional: Positive for fatigue. Negative for appetite change, chills, fever and unexpected weight change. HENT: Positive for ear pain, postnasal drip, rhinorrhea, sinus pressure and sinus pain. Negative for sore throat. Respiratory: Negative for cough, chest tightness, shortness of breath and wheezing. Cardiovascular: Negative for chest pain, palpitations and leg swelling. Gastrointestinal: Negative for abdominal pain, constipation, diarrhea, nausea and vomiting.    Musculoskeletal: Negative for arthralgias. Neurological: Negative for weakness, numbness and headaches. Hematological: Negative for adenopathy. OBJECTIVE:    /62 (Site: Left Upper Arm, Position: Sitting, Cuff Size: Large Adult)   Pulse 106   Temp 97.9 °F (36.6 °C)   Resp 16   Wt 164 lb (74.4 kg)   SpO2 96%   BMI 25.69 kg/m²     Physical Exam  Constitutional:       Appearance: Normal appearance. She is well-developed. HENT:      Head: Normocephalic and atraumatic. Right Ear: Hearing, tympanic membrane, ear canal and external ear normal.      Left Ear: Hearing, tympanic membrane, ear canal and external ear normal.      Nose:      Right Sinus: Maxillary sinus tenderness and frontal sinus tenderness present. Left Sinus: Maxillary sinus tenderness and frontal sinus tenderness present. Mouth/Throat:      Mouth: Mucous membranes are moist.      Pharynx: Uvula midline. Oropharyngeal exudate (postnasal drainage) present. No pharyngeal swelling or posterior oropharyngeal erythema. Tonsils: No tonsillar exudate. Neck:      Musculoskeletal: Normal range of motion and neck supple. Cardiovascular:      Rate and Rhythm: Normal rate and regular rhythm. Heart sounds: Normal heart sounds. No murmur. No friction rub. No gallop. Pulmonary:      Effort: Pulmonary effort is normal. No respiratory distress. Breath sounds: Normal breath sounds. No wheezing, rhonchi or rales. Musculoskeletal: Normal range of motion. Lymphadenopathy:      Cervical: No cervical adenopathy. Right cervical: No superficial or posterior cervical adenopathy. Left cervical: No superficial or posterior cervical adenopathy. Skin:     General: Skin is warm and dry. Neurological:      Mental Status: She is alert and oriented to person, place, and time. Psychiatric:         Behavior: Behavior normal.         ASSESSMENT/PLAN:    1. Sinus pain  Nasal saline rinses. flonase and astelin.  Plenty of rest and

## 2021-03-25 LAB
SARS-COV-2: NOT DETECTED
SOURCE: NORMAL

## 2021-03-26 DIAGNOSIS — R09.81 NASAL CONGESTION: ICD-10-CM

## 2021-03-29 ENCOUNTER — TELEPHONE (OUTPATIENT)
Dept: FAMILY MEDICINE CLINIC | Age: 56
End: 2021-03-29

## 2021-03-29 DIAGNOSIS — J32.9 CHRONIC SINUSITIS, UNSPECIFIED LOCATION: Primary | ICD-10-CM

## 2021-03-29 RX ORDER — LORATADINE 10 MG/1
10 TABLET ORAL DAILY
Qty: 30 TABLET | Refills: 1 | Status: SHIPPED | OUTPATIENT
Start: 2021-03-29 | End: 2022-02-23

## 2021-03-29 NOTE — TELEPHONE ENCOUNTER
Patient called-she isn't feeling any better-still has all the sinus congestion-she tested (-) for covid-she is requesting a referral to ent-please advise

## 2021-05-21 ENCOUNTER — TELEPHONE (OUTPATIENT)
Dept: FAMILY MEDICINE CLINIC | Age: 56
End: 2021-05-21

## 2021-06-08 ENCOUNTER — OFFICE VISIT (OUTPATIENT)
Dept: FAMILY MEDICINE CLINIC | Age: 56
End: 2021-06-08
Payer: COMMERCIAL

## 2021-06-08 VITALS
BODY MASS INDEX: 26.21 KG/M2 | DIASTOLIC BLOOD PRESSURE: 82 MMHG | WEIGHT: 167 LBS | HEART RATE: 85 BPM | SYSTOLIC BLOOD PRESSURE: 125 MMHG | OXYGEN SATURATION: 96 % | HEIGHT: 67 IN

## 2021-06-08 DIAGNOSIS — R53.83 FATIGUE, UNSPECIFIED TYPE: Primary | ICD-10-CM

## 2021-06-08 PROCEDURE — 99214 OFFICE O/P EST MOD 30 MIN: CPT | Performed by: FAMILY MEDICINE

## 2021-06-08 RX ORDER — MONTELUKAST SODIUM 10 MG/1
10 TABLET ORAL DAILY
Qty: 30 TABLET | Refills: 3 | Status: SHIPPED | OUTPATIENT
Start: 2021-06-08 | End: 2021-11-16

## 2021-06-08 NOTE — PROGRESS NOTES
Jairo Alvarezin  1965 06/13/21    Chief Complaint   Patient presents with   Pilar Tracey     Patient here to establish care with PCP           54 yrs old lady with non PMH came today to establish with us, patient c/o fatigue, no abdominal pain, no chest pain or SOB.       Past Medical History:   Diagnosis Date    Dental abscess [facility] ED    Pneumonia 1988    Psoriasis     Spondylolysis 9-10-12    Bilateral L5 pars defects, CT done 9-10-12 [facility]     Past Surgical History:   Procedure Laterality Date    CHOLECYSTECTOMY  1988    [place] stones   [place]    HYSTERECTOMY  2004    benign tumor right ovary    HYSTERECTOMY, VAGINAL      TUBAL LIGATION  1993     Family History   Problem Relation Age of Onset    Cancer Mother     Diabetes Mother     High Blood Pressure Mother     Obesity Mother     High Cholesterol Mother     Allergies Father     Diabetes Son     Depression Son     Stroke Maternal Grandfather      Social History     Socioeconomic History    Marital status: Single     Spouse name: Not on file    Number of children: Not on file    Years of education: Not on file    Highest education level: Not on file   Occupational History    Not on file   Tobacco Use    Smoking status: Current Every Day Smoker     Packs/day: 1.50     Years: 25.00     Pack years: 37.50     Start date: 12/19/1980    Smokeless tobacco: Never Used   Vaping Use    Vaping Use: Never used   Substance and Sexual Activity    Alcohol use: Yes     Comment: occassionally    Drug use: No    Sexual activity: Not Currently   Other Topics Concern    Not on file   Social History Narrative    Not on file     Social Determinants of Health     Financial Resource Strain:     Difficulty of Paying Living Expenses:    Food Insecurity:     Worried About Running Out of Food in the Last Year:     Ran Out of Food in the Last Year:    Transportation Needs:  Lack of Transportation (Medical):  Lack of Transportation (Non-Medical):    Physical Activity:     Days of Exercise per Week:     Minutes of Exercise per Session:    Stress:     Feeling of Stress :    Social Connections:     Frequency of Communication with Friends and Family:     Frequency of Social Gatherings with Friends and Family:     Attends Episcopal Services:     Active Member of Clubs or Organizations:     Attends Club or Organization Meetings:     Marital Status:    Intimate Partner Violence:     Fear of Current or Ex-Partner:     Emotionally Abused:     Physically Abused:     Sexually Abused: Allergies   Allergen Reactions    Codeine      Stomach cramps    Tramadol     Zofran [Ondansetron Hcl] Other (See Comments)     Severe abd pain     Current Outpatient Medications   Medication Sig Dispense Refill    montelukast (SINGULAIR) 10 MG tablet Take 1 tablet by mouth daily 30 tablet 3    loratadine (CLARITIN) 10 MG tablet Take 1 tablet by mouth daily 30 tablet 1    Clobetasol Propionate 0.05 % SHAM Apply 1 Dose topically daily as needed (psoriasis) 1 Bottle 1    albuterol sulfate  (90 Base) MCG/ACT inhaler INHALE 2 PUFFS INTO THE LUNGS 4 TIMES DAILY AS NEEDED FOR WHEEZING 6.7 Inhaler 0    azelastine (ASTELIN) 0.1 % nasal spray 1 spray by Nasal route 2 times daily Use in each nostril as directed 1 Bottle 0    fluticasone (FLONASE) 50 MCG/ACT nasal spray 2 sprays by Each Nostril route daily 1 Bottle 0    Naproxen Sodium (ALEVE) 220 MG CAPS Take 1 tablet by mouth as needed for Pain.  Multiple Vitamin (MULTIVITAMIN PO) Take 1 tablet by mouth daily. No current facility-administered medications for this visit. Review of Systems   Constitutional: Positive for fatigue. Negative for activity change, appetite change, chills and fever. HENT: Negative for congestion, postnasal drip, sinus pressure and sore throat.     Respiratory: Negative for cough, chest tightness and shortness of breath. Cardiovascular: Negative for chest pain and leg swelling. Gastrointestinal: Negative for abdominal pain, constipation and diarrhea. Genitourinary: Negative for dysuria and frequency. Musculoskeletal: Negative for back pain. Skin: Negative for rash. Neurological: Negative for dizziness, weakness and headaches. Psychiatric/Behavioral: Negative for agitation and behavioral problems. The patient is not nervous/anxious. Lab Results   Component Value Date    WBC 8.4 06/25/2020    HGB 16.2 (H) 06/25/2020    HCT 51.4 (H) 06/25/2020    MCV 89.9 06/25/2020     06/25/2020     Lab Results   Component Value Date     06/25/2020    K 4.9 06/25/2020     06/25/2020    CO2 33 (H) 06/25/2020    BUN 8 06/25/2020    CREATININE 0.8 06/25/2020    GLUCOSE 98 04/22/2020    CALCIUM 9.9 06/25/2020    PROT 7.3 06/25/2020    LABALBU 4.4 06/25/2020    BILITOT 0.3 06/25/2020    ALKPHOS 101 06/25/2020    AST 24 06/25/2020    ALT 30 06/25/2020    LABGLOM >60 06/25/2020    GFRAA >60 06/25/2020     Lab Results   Component Value Date    CHOL 156 04/22/2020    CHOL 189 01/03/2018    CHOL 157 07/21/2011     Lab Results   Component Value Date    TRIG 75 04/22/2020    TRIG 140 01/03/2018    TRIG 85 07/21/2011     Lab Results   Component Value Date    HDL 50 04/22/2020    HDL 43 01/03/2018    HDL 41 (L) 07/21/2011     Lab Results   Component Value Date    LDLCALC 99 07/21/2011     No results found for: LABA1C  Lab Results   Component Value Date    TSHHS 1.610 06/25/2020         /82   Pulse 85   Ht 5' 7\" (1.702 m)   Wt 167 lb (75.8 kg)   SpO2 96%   BMI 26.16 kg/m²     BP Readings from Last 3 Encounters:   06/08/21 125/82   03/24/21 100/62   03/04/21 128/76       Wt Readings from Last 3 Encounters:   06/08/21 167 lb (75.8 kg)   03/24/21 164 lb (74.4 kg)   03/04/21 159 lb (72.1 kg)         Physical Exam  Constitutional:       General: She is not in acute distress. Appearance: Normal appearance. She is well-developed. She is not ill-appearing or diaphoretic. HENT:      Head: Normocephalic and atraumatic. Eyes:      General: No scleral icterus. Pupils: Pupils are equal, round, and reactive to light. Cardiovascular:      Rate and Rhythm: Normal rate and regular rhythm. Heart sounds: Normal heart sounds. No murmur heard. Pulmonary:      Effort: Pulmonary effort is normal.      Breath sounds: Normal breath sounds. No wheezing. Musculoskeletal:         General: Normal range of motion. Cervical back: Normal range of motion and neck supple. Right lower leg: No edema. Left lower leg: No edema. Skin:     Findings: No rash. Neurological:      General: No focal deficit present. Mental Status: She is alert and oriented to person, place, and time. Psychiatric:         Mood and Affect: Mood normal.         Behavior: Behavior normal.         ASSESSMENT/ PLAN:    1. Fatigue, unspecified type  - Comprehensive Metabolic Panel, Fasting; Future  - CBC Auto Differential; Future  - TSH without Reflex; Future  - T4, Free; Future  - Vitamin B12 & Folate; Future  - Vitamin D 25 Hydroxy; Future  - Lipid Panel; Future              - All old blood work reviewed with the patient  - Appropriate prescription are addressed. - After visit summery provided. - Questions answered and patient verbalizes understanding.  - Call for any problem, questions, or concerns.  - RTC if symptoms worse. Return in about 6 months (around 12/8/2021).

## 2021-06-13 ASSESSMENT — ENCOUNTER SYMPTOMS
DIARRHEA: 0
CHEST TIGHTNESS: 0
SINUS PRESSURE: 0
ABDOMINAL PAIN: 0
SORE THROAT: 0
COUGH: 0
SHORTNESS OF BREATH: 0
BACK PAIN: 0
CONSTIPATION: 0

## 2021-06-15 ENCOUNTER — HOSPITAL ENCOUNTER (OUTPATIENT)
Age: 56
Discharge: HOME | End: 2021-06-15
Payer: COMMERCIAL

## 2021-06-15 LAB
ALBUMIN SERPL-MCNC: 4.3 GM/DL (ref 3.4–5)
ALP BLD-CCNC: 97 IU/L (ref 40–129)
ALT SERPL-CCNC: 13 U/L (ref 10–40)
ANION GAP SERPL CALCULATED.3IONS-SCNC: 9 MMOL/L (ref 4–16)
AST SERPL-CCNC: 15 IU/L (ref 15–37)
BASOPHILS ABSOLUTE: 0.1 K/CU MM
BASOPHILS RELATIVE PERCENT: 0.6 % (ref 0–1)
BILIRUB SERPL-MCNC: 0.3 MG/DL (ref 0–1)
BUN BLDV-MCNC: 18 MG/DL (ref 6–23)
CALCIUM SERPL-MCNC: 9.6 MG/DL (ref 8.3–10.6)
CHLORIDE BLD-SCNC: 104 MMOL/L (ref 99–110)
CHOLESTEROL: 224 MG/DL
CO2: 27 MMOL/L (ref 21–32)
CREAT SERPL-MCNC: 0.8 MG/DL (ref 0.6–1.1)
DIFFERENTIAL TYPE: ABNORMAL
EOSINOPHILS ABSOLUTE: 0.2 K/CU MM
EOSINOPHILS RELATIVE PERCENT: 2.3 % (ref 0–3)
FOLATE: 16.4 NG/ML (ref 3.1–17.5)
GFR AFRICAN AMERICAN: >60 ML/MIN/1.73M2
GFR NON-AFRICAN AMERICAN: >60 ML/MIN/1.73M2
GLUCOSE FASTING: 100 MG/DL (ref 70–99)
HCT VFR BLD CALC: 48.9 % (ref 37–47)
HDLC SERPL-MCNC: 70 MG/DL
HEMOGLOBIN: 15.7 GM/DL (ref 12.5–16)
IMMATURE NEUTROPHIL %: 0.4 % (ref 0–0.43)
LDL CHOLESTEROL DIRECT: 144 MG/DL
LYMPHOCYTES ABSOLUTE: 2.2 K/CU MM
LYMPHOCYTES RELATIVE PERCENT: 28.1 % (ref 24–44)
MCH RBC QN AUTO: 29.3 PG (ref 27–31)
MCHC RBC AUTO-ENTMCNC: 32.1 % (ref 32–36)
MCV RBC AUTO: 91.4 FL (ref 78–100)
MONOCYTES ABSOLUTE: 0.8 K/CU MM
MONOCYTES RELATIVE PERCENT: 9.5 % (ref 0–4)
PDW BLD-RTO: 13.8 % (ref 11.7–14.9)
PLATELET # BLD: 276 K/CU MM (ref 140–440)
PMV BLD AUTO: 10 FL (ref 7.5–11.1)
POTASSIUM SERPL-SCNC: 4.7 MMOL/L (ref 3.5–5.1)
RBC # BLD: 5.35 M/CU MM (ref 4.2–5.4)
SEGMENTED NEUTROPHILS ABSOLUTE COUNT: 4.7 K/CU MM
SEGMENTED NEUTROPHILS RELATIVE PERCENT: 59.1 % (ref 36–66)
SODIUM BLD-SCNC: 140 MMOL/L (ref 135–145)
T4 FREE: 0.97 NG/DL (ref 0.9–1.8)
TOTAL IMMATURE NEUTOROPHIL: 0.03 K/CU MM
TOTAL PROTEIN: 6.9 GM/DL (ref 6.4–8.2)
TRIGL SERPL-MCNC: 67 MG/DL
TSH HIGH SENSITIVITY: 1.71 UIU/ML (ref 0.27–4.2)
VITAMIN B-12: 154.7 PG/ML (ref 211–911)
VITAMIN D 25-HYDROXY: 18.21 NG/ML
WBC # BLD: 7.9 K/CU MM (ref 4–10.5)

## 2021-06-15 PROCEDURE — 80053 COMPREHEN METABOLIC PANEL: CPT

## 2021-06-15 PROCEDURE — 82306 VITAMIN D 25 HYDROXY: CPT

## 2021-06-15 PROCEDURE — 82746 ASSAY OF FOLIC ACID SERUM: CPT

## 2021-06-15 PROCEDURE — 80061 LIPID PANEL: CPT

## 2021-06-15 PROCEDURE — 36415 COLL VENOUS BLD VENIPUNCTURE: CPT

## 2021-06-15 PROCEDURE — 84443 ASSAY THYROID STIM HORMONE: CPT

## 2021-06-15 PROCEDURE — 83721 ASSAY OF BLOOD LIPOPROTEIN: CPT

## 2021-06-15 PROCEDURE — 82607 VITAMIN B-12: CPT

## 2021-06-15 PROCEDURE — 85025 COMPLETE CBC W/AUTO DIFF WBC: CPT

## 2021-06-15 PROCEDURE — 84439 ASSAY OF FREE THYROXINE: CPT

## 2021-06-23 ENCOUNTER — OFFICE VISIT (OUTPATIENT)
Dept: FAMILY MEDICINE CLINIC | Age: 56
End: 2021-06-23
Payer: COMMERCIAL

## 2021-06-23 VITALS
WEIGHT: 169 LBS | HEART RATE: 93 BPM | SYSTOLIC BLOOD PRESSURE: 117 MMHG | OXYGEN SATURATION: 95 % | BODY MASS INDEX: 26.47 KG/M2 | DIASTOLIC BLOOD PRESSURE: 81 MMHG

## 2021-06-23 DIAGNOSIS — E55.9 VITAMIN D DEFICIENCY: ICD-10-CM

## 2021-06-23 DIAGNOSIS — R53.83 FATIGUE, UNSPECIFIED TYPE: Primary | ICD-10-CM

## 2021-06-23 DIAGNOSIS — E53.8 VITAMIN B12 DEFICIENCY: ICD-10-CM

## 2021-06-23 DIAGNOSIS — E78.2 MIXED HYPERLIPIDEMIA: ICD-10-CM

## 2021-06-23 LAB
BILIRUBIN, POC: NORMAL
BLOOD URINE, POC: NEGATIVE
CLARITY, POC: CLEAR
COLOR, POC: YELLOW
GLUCOSE URINE, POC: NEGATIVE
KETONES, POC: NORMAL
LEUKOCYTE EST, POC: NEGATIVE
NITRITE, POC: NEGATIVE
PH, POC: 5.5
PROTEIN, POC: NORMAL
SPECIFIC GRAVITY, POC: >=1.03
UROBILINOGEN, POC: NORMAL

## 2021-06-23 PROCEDURE — 96372 THER/PROPH/DIAG INJ SC/IM: CPT | Performed by: FAMILY MEDICINE

## 2021-06-23 PROCEDURE — 99214 OFFICE O/P EST MOD 30 MIN: CPT | Performed by: FAMILY MEDICINE

## 2021-06-23 PROCEDURE — 81002 URINALYSIS NONAUTO W/O SCOPE: CPT | Performed by: FAMILY MEDICINE

## 2021-06-23 RX ORDER — CYANOCOBALAMIN 1000 UG/ML
1000 INJECTION INTRAMUSCULAR; SUBCUTANEOUS ONCE
Status: COMPLETED | OUTPATIENT
Start: 2021-06-23 | End: 2021-06-23

## 2021-06-23 RX ORDER — ERGOCALCIFEROL (VITAMIN D2) 1250 MCG
50000 CAPSULE ORAL WEEKLY
Qty: 4 CAPSULE | Refills: 5 | Status: SHIPPED | OUTPATIENT
Start: 2021-06-23

## 2021-06-23 RX ADMIN — CYANOCOBALAMIN 1000 MCG: 1000 INJECTION INTRAMUSCULAR; SUBCUTANEOUS at 15:24

## 2021-06-23 NOTE — PROGRESS NOTES
Deny Nixon  1965 06/27/21    Chief Complaint   Patient presents with    Fatigue     Patient states she has no energy    Nausea     Patient states having nausea every day and vomiting a few times a week           Patient here because still fatigue, tired, was seen on 6/ 8/21, we did blood work, also here to discuss the lab results, showed low vit D and high cholesterol, patient stats, not feeling well, irritable, no energy, positive for nausea and vomiting, denies abdominal pain, no blood in the stool, no urinary symptoms, stats no depression or anxiety.       Past Medical History:   Diagnosis Date    Dental abscess [facility] ED    Pneumonia 1988    Psoriasis     Spondylolysis 9-10-12    Bilateral L5 pars defects, CT done 9-10-12 [facility]     Past Surgical History:   Procedure Laterality Date    CHOLECYST[place]OMY  1988    [place]   [place]    HYSTERECTOMY  2004    benign tumor right ovary    HYSTERECTOMY, VAGINAL      TUBAL LIGATION  1993     Family History   Problem Relation Age of Onset    Cancer Mother     Diabetes Mother     High Blood Pressure Mother     Obesity Mother     High Cholesterol Mother     Allergies Father     Diabetes Son     Depression Son     Stroke Maternal Grandfather      Social History     Socioeconomic History    Marital status: Single     Spouse name: Not on file    Number of children: Not on file    Years of education: Not on file    Highest education level: Not on file   Occupational History    Not on file   Tobacco Use    Smoking status: Current Every Day Smoker     Packs/day: 1.50     Years: 25.00     Pack years: 37.50     Start date: 12/19/1980    Smokeless tobacco: Never Used   Vaping Use    Vaping Use: Never used   Substance and Sexual Activity    Alcohol use: Yes     Comment: occassionally    Drug use: No    Sexual activity: Not Currently   Other Topics Concern    Not on file Social History Narrative    Not on file     Social Determinants of Health     Financial Resource Strain:     Difficulty of Paying Living Expenses:    Food Insecurity:     Worried About Running Out of Food in the Last Year:     [address] in the Last Year:    Transportation Needs:     Lack of Transportation (Medical):  Lack of Transportation (Non-Medical):    Physical Activity:     Days of Exercise per Week:     Minutes of Exercise per Session:    Stress:     Feeling of Stress :    Social Connections:     Frequency of Communication with Friends and Family:     Frequency of Social Gatherings with Friends and Family:     Attends [employer] Services:     Active Member of Clubs or Organizations:     Attends Club or Organization Meetings:     Marital Status:    Intimate Partner Violence:     Fear of Current or Ex-Partner:     Emotionally Abused:     Physically Abused:     Sexually Abused:         Allergies   Allergen Reactions    Codeine      Stomach cramps    Tramadol     Zofran [Ondansetron Hcl] Other (See Comments)     Severe abd pain     Current Outpatient Medications   Medication Sig Dispense Refill    ergocalciferol (DRISDOL) 1.25 MG (33241 UT) capsule Take 1 capsule by mouth once a week 4 capsule 5    montelukast (SINGULAIR) 10 MG tablet Take 1 tablet by mouth daily 30 tablet 3    loratadine (CLARITIN) 10 MG tablet Take 1 tablet by mouth daily 30 tablet 1    Clobetasol Propionate 0.05 % SHAM Apply 1 Dose topically daily as needed (psoriasis) 1 Bottle 1    albuterol sulfate  (90 Base) MCG/ACT inhaler INHALE 2 PUFFS INTO THE LUNGS 4 TIMES DAILY AS NEEDED FOR WHEEZING 6.7 Inhaler 0    azelastine (ASTELIN) 0.1 % nasal spray 1 spray by Nasal route 2 times daily Use in each nostril as directed 1 Bottle 0    fluticasone (FLONASE) 50 MCG/ACT nasal spray 2 sprays by Each Nostril route daily 1 Bottle 0    Naproxen Sodium (ALEVE) 220 MG CAPS Take 1 tablet by mouth as needed for Pain.      Multiple Vitamin (MULTIVITAMIN PO) Take 1 tablet by mouth daily. No current facility-administered medications for this visit. Review of Systems   Constitutional: Positive for activity change and fatigue. Negative for appetite change, chills and fever. HENT: Negative for congestion. Respiratory: Negative for cough, chest tightness and shortness of breath. Cardiovascular: Negative for chest pain and leg swelling. Gastrointestinal: Positive for nausea and vomiting. Negative for abdominal pain, constipation and diarrhea. Genitourinary: Negative for dysuria and frequency. Musculoskeletal: Negative for back pain. Skin: Negative for rash. Neurological: Negative for dizziness and headaches. Psychiatric/Behavioral: Negative for agitation and behavioral problems. The patient is not nervous/anxious.         Lab Results   Component Value Date    WBC 7.9 06/15/2021    HGB 15.7 06/15/2021    HCT 48.9 (H) 06/15/2021    MCV 91.4 06/15/2021     06/15/2021     Lab Results   Component Value Date     06/15/2021    K 4.7 06/15/2021     06/15/2021    CO2 27 06/15/2021    BUN 18 06/15/2021    CREATININE 0.8 06/15/2021    GLUCOSE 98 04/22/2020    CALCIUM 9.6 06/15/2021    PROT 6.9 06/15/2021    LABALBU 4.3 06/15/2021    BILITOT 0.3 06/15/2021    ALKPHOS 97 06/15/2021    AST 15 06/15/2021    ALT 13 06/15/2021    LABGLOM >60 06/15/2021    GFRAA >60 06/15/2021     Lab Results   Component Value Date    CHOL 224 (H) 06/15/2021    CHOL 156 04/22/2020    CHOL 189 01/03/2018     Lab Results   Component Value Date    TRIG 67 06/15/2021    TRIG 75 04/22/2020    TRIG 140 01/03/2018     Lab Results   Component Value Date    HDL 70 06/15/2021    HDL 50 04/22/2020    HDL 43 01/03/2018     Lab Results   Component Value Date    LDLCALC 99 07/21/2011     No results found for: LABA1C  Lab Results   Component Value Date    TSHHS 1.710 06/15/2021         /81 (Site: Left Upper Arm, Position: Sitting, Cuff Size: Large Adult)   Pulse 93   Wt 169 lb (76.7 kg)   SpO2 95%   BMI 26.47 kg/m²     BP Readings from Last 3 Encounters:   06/23/21 117/81   06/08/21 125/82   03/24/21 100/62       Wt Readings from Last 3 Encounters:   06/23/21 169 lb (76.7 kg)   06/08/21 167 lb (75.8 kg)   03/24/21 164 lb (74.4 kg)         Physical Exam  Constitutional:       General: She is not in acute distress. Appearance: Normal appearance. She is well-developed. She is not ill-appearing or diaphoretic. Comments: irritable   HENT:      Head: Normocephalic and atraumatic. Eyes:      General: No scleral icterus. Pupils: Pupils are equal, round, and reactive to light. Cardiovascular:      Rate and Rhythm: Normal rate and regular rhythm. Heart sounds: Normal heart sounds. No murmur heard. Pulmonary:      Effort: Pulmonary effort is normal.      Breath sounds: Normal breath sounds. No wheezing. Musculoskeletal:         General: Normal range of motion. Cervical back: Normal range of motion and neck supple. No rigidity. Right lower leg: No edema. Left lower leg: No edema. Skin:     Findings: No rash. Neurological:      General: No focal deficit present. Mental Status: She is alert and oriented to person, place, and time. Psychiatric:         Mood and Affect: Mood normal.         Behavior: Behavior normal.         ASSESSMENT/ PLAN:    1. Fatigue, unspecified type  - associated with N/V  - all blood work discussed with the patient  - POCT Urinalysis no Micro  - Ambulatory referral to Cardiology    2. Vitamin D deficiency  - start:  - ergocalciferol (DRISDOL) 1.25 MG (28009 UT) capsule; Take 1 capsule by mouth once a week  Dispense: 4 capsule; Refill: 5    3. Vitamin B12 deficiency  - at lower level  - cyanocobalamin injection 1,000 mcg    5.  Hyperlipidemia  - recommend watch diet and excercise          - All old blood work reviewed with the patient  - Appropriate prescription are addressed. - After visit summery provided. - Questions answered and patient verbalizes understanding.  - Call for any problem, questions, or concerns. Return if symptoms worsen or fail to improve.

## 2021-06-24 ENCOUNTER — HOSPITAL ENCOUNTER (OUTPATIENT)
Age: 56
Discharge: HOME | End: 2021-06-24
Payer: COMMERCIAL

## 2021-06-24 LAB
HBV SURFACE AB TITR SER: <3.5 {TITER}
HEPATITIS B SURFACE ANTIGEN: NON REACTIVE
HEPATITIS C ANTIBODY: NON REACTIVE

## 2021-06-24 PROCEDURE — 86480 TB TEST CELL IMMUN MEASURE: CPT

## 2021-06-24 PROCEDURE — 86803 HEPATITIS C AB TEST: CPT

## 2021-06-24 PROCEDURE — 86704 HEP B CORE ANTIBODY TOTAL: CPT

## 2021-06-24 PROCEDURE — 36415 COLL VENOUS BLD VENIPUNCTURE: CPT

## 2021-06-25 LAB — HEPATITIS B CORE TOTAL ANTIBODY: NEGATIVE

## 2021-06-27 PROBLEM — E55.9 VITAMIN D DEFICIENCY: Status: ACTIVE | Noted: 2021-06-27

## 2021-06-27 PROBLEM — E53.8 VITAMIN B12 DEFICIENCY: Status: ACTIVE | Noted: 2021-06-27

## 2021-06-27 PROBLEM — E78.2 MIXED HYPERLIPIDEMIA: Status: ACTIVE | Noted: 2021-06-27

## 2021-06-27 PROBLEM — R53.83 FATIGUE: Status: ACTIVE | Noted: 2021-06-27

## 2021-06-27 ASSESSMENT — ENCOUNTER SYMPTOMS
ABDOMINAL PAIN: 0
VOMITING: 1
BACK PAIN: 0
CONSTIPATION: 0
DIARRHEA: 0
CHEST TIGHTNESS: 0
NAUSEA: 1
COUGH: 0
SHORTNESS OF BREATH: 0

## 2021-06-28 LAB
QUANTI TB1 MINUS NIL: 0 IU/ML (ref 0–0.34)
QUANTI TB2 MINUS NIL: 0 IU/ML (ref 0–0.34)
QUANTIFERON (R) TB GOLD (INCUBATED): NEGATIVE IU/ML
QUANTIFERON MITOGEN MINUS NIL: 6.93 IU/ML
QUANTIFERON NIL: 0.02 IU/ML

## 2021-06-30 ENCOUNTER — NURSE ONLY (OUTPATIENT)
Dept: FAMILY MEDICINE CLINIC | Age: 56
End: 2021-06-30
Payer: COMMERCIAL

## 2021-06-30 DIAGNOSIS — E53.8 VITAMIN B12 DEFICIENCY: Primary | ICD-10-CM

## 2021-06-30 PROCEDURE — 96372 THER/PROPH/DIAG INJ SC/IM: CPT | Performed by: FAMILY MEDICINE

## 2021-06-30 RX ORDER — CYANOCOBALAMIN 1000 UG/ML
1000 INJECTION INTRAMUSCULAR; SUBCUTANEOUS ONCE
Status: COMPLETED | OUTPATIENT
Start: 2021-06-30 | End: 2021-06-30

## 2021-06-30 RX ADMIN — CYANOCOBALAMIN 1000 MCG: 1000 INJECTION INTRAMUSCULAR; SUBCUTANEOUS at 12:55

## 2021-07-01 ENCOUNTER — NURSE ONLY (OUTPATIENT)
Dept: CARDIOLOGY CLINIC | Age: 56
End: 2021-07-01
Payer: COMMERCIAL

## 2021-07-01 ENCOUNTER — INITIAL CONSULT (OUTPATIENT)
Dept: CARDIOLOGY CLINIC | Age: 56
End: 2021-07-01
Payer: COMMERCIAL

## 2021-07-01 VITALS
SYSTOLIC BLOOD PRESSURE: 120 MMHG | BODY MASS INDEX: 26.06 KG/M2 | OXYGEN SATURATION: 97 % | HEART RATE: 97 BPM | HEIGHT: 67 IN | WEIGHT: 166 LBS | DIASTOLIC BLOOD PRESSURE: 70 MMHG

## 2021-07-01 DIAGNOSIS — Z72.0 TOBACCO ABUSE: ICD-10-CM

## 2021-07-01 DIAGNOSIS — R53.83 OTHER FATIGUE: Primary | ICD-10-CM

## 2021-07-01 DIAGNOSIS — E78.2 MIXED HYPERLIPIDEMIA: ICD-10-CM

## 2021-07-01 DIAGNOSIS — R00.1 BRADYCARDIA: Primary | ICD-10-CM

## 2021-07-01 DIAGNOSIS — R53.83 OTHER FATIGUE: ICD-10-CM

## 2021-07-01 DIAGNOSIS — I49.9 IRREGULAR HEART RATE: ICD-10-CM

## 2021-07-01 PROCEDURE — 99243 OFF/OP CNSLTJ NEW/EST LOW 30: CPT | Performed by: INTERNAL MEDICINE

## 2021-07-01 PROCEDURE — 93225 XTRNL ECG REC<48 HRS REC: CPT | Performed by: INTERNAL MEDICINE

## 2021-07-01 NOTE — ASSESSMENT & PLAN NOTE
Less likely to be cardiac etiology for severe exhaustion. But will get 48-hour monitor to see for heart rate. Also get a treadmill stress to see heart rate response report for sleep apnea evaluation and get an echo to rule out structural heart disease.   Explained that her symptoms could be related to post Covid fatigue or low vitamin D levels

## 2021-07-01 NOTE — PROGRESS NOTES
48 hour holter monitor applied at 5:00 pm for bradycardia, irregular heart rate, fatigue. Serial # [id number] . Instructed patient on monitor and proper use. Instructed on diary. When to remove and bring it back. Must leave the holter monitor on without removing for the duration of time ordered. Answered all questions the patient had. Instructed patient to call [facility] at [phone] with any questions or concerns with the monitor.

## 2021-07-01 NOTE — PROGRESS NOTES
CARDIOLOGY CONSULT   NOTE    Chief Complaint: fatigue     HPI:   Kay Rogers is a 54y.o. year old who has Past medical history as noted below. Kay Rogers comes in for evaluation due to profound fatigue which is ongoing for several weeks or maybe months now. She states that her symptoms originally started after she had very bad Covid? She was unable to go to work for 2 months. She is sleeping 8 to 10 hours has no energy unable to walk much. She denies any chest pain but feels extreme exhaustion all the time. So far work-up included normal thyroid function low vitamin D levels. She is not on any chronic medication to try denies any depression or opioids      Current Outpatient Medications   Medication Sig Dispense Refill    ergocalciferol (DRISDOL) 1.25 MG (76767 UT) capsule Take 1 capsule by mouth once a week 4 capsule 5    montelukast (SINGULAIR) 10 MG tablet Take 1 tablet by mouth daily 30 tablet 3    loratadine (CLARITIN) 10 MG tablet Take 1 tablet by mouth daily 30 tablet 1    Clobetasol Propionate 0.05 % SHAM Apply 1 Dose topically daily as needed (psoriasis) 1 Bottle 1    albuterol sulfate  (90 Base) MCG/ACT inhaler INHALE 2 PUFFS INTO THE LUNGS 4 TIMES DAILY AS NEEDED FOR WHEEZING 6.7 Inhaler 0    azelastine (ASTELIN) 0.1 % nasal spray 1 spray by Nasal route 2 times daily Use in each nostril as directed 1 Bottle 0    fluticasone (FLONASE) 50 MCG/ACT nasal spray 2 sprays by Each Nostril route daily 1 Bottle 0    Naproxen Sodium (ALEVE) 220 MG CAPS Take 1 tablet by mouth as needed for Pain.  Multiple Vitamin (MULTIVITAMIN PO) Take 1 tablet by mouth daily. No current facility-administered medications for this visit.        Allergies:   Codeine, Tramadol, and Zofran [ondansetron hcl]    Patient History:  Past Medical History:   Diagnosis Date    Dental abscess 2015    [facility]    Pneumonia 1988    Psoriasis     Spondylolysis 9-10-12 Bilateral L5 pars defects, CT done 9-10-12 [facility]     Past Surgical History:   Procedure Laterality Date    CHOLECYSTECTOMY  1988    [place] stones   [place]    HYSTERECTOMY  2004    benign tumor right ovary    HYSTERECTOMY, VAGINAL      TUBAL LIGATION  1993     Family History   Problem Relation Age of Onset    Cancer Mother     Diabetes Mother     High Blood Pressure Mother     Obesity Mother     High Cholesterol Mother     Allergies Father     Diabetes Son     Depression Son     Stroke Maternal Grandfather      Social History     Tobacco Use    Smoking status: Current Every Day Smoker     Packs/day: 1.50     Years: 25.00     Pack years: 37.50     Start date: 12/19/1980    Smokeless tobacco: Never Used   Substance Use Topics    Alcohol use: Yes     Comment: occassionally        Review of Systems:   · Constitutional: No Fever or Weight Loss   · Eyes: No Decreased Vision  · ENT: No Headaches, Hearing Loss or Vertigo  · Cardiovascular: as per note above   · Respiratory: No cough or wheezing and as per note above.    · Gastrointestinal: No abdominal pain, appetite loss, blood in stools, constipation, diarrhea or heartburn  · Genitourinary: No dysuria, trouble voiding, or hematuria  · Musculoskeletal:  denies any new  joint aches , swelling  or pain   · Integumentary: No rash or pruritis  · Neurological: No TIA or stroke symptoms  · Psychiatric: No anxiety or depression  · Endocrine: No malaise, fatigue or temperature intolerance  · Hematologic/Lymphatic: No bleeding problems, blood clots or swollen lymph nodes  · Allergic/Immunologic: No nasal congestion or hives    Objective:      Physical Exam:  /70 (Site: Left Upper Arm, Position: Sitting, Cuff Size: Medium Adult)   Pulse 97   Ht 5' 7\" (1.702 m)   Wt 166 lb (75.3 kg)   SpO2 97%   BMI 26.00 kg/m²   Wt Readings from Last 3 Encounters:   07/01/21 166 lb (75.3 kg)   06/23/21 169 lb (76.7 kg) 06/08/21 167 lb (75.8 kg)     Body mass index is 26 kg/m². Vitals:    07/01/21 1634   BP: 120/70   Pulse: 97   SpO2: 97%        General Appearance:  No distress, conversant  Constitutional:  Well developed, Well nourished, No acute distress, Non-toxic appearance. HENT:  Normocephalic, Atraumatic, Bilateral external ears normal, Oropharynx moist, No oral exudates, Nose normal. Neck- Normal range of motion, No tenderness, Supple, No stridor,no apical-carotid delay  Eyes:  PERRL, EOMI, Conjunctiva normal, No discharge. Respiratory:  Normal breath sounds, No respiratory distress, No wheezing, No chest tenderness. ,no use of accessory muscles, NO crackles  Cardiovascular: (PMI) apex non displaced,no lifts no thrills,S1 and S2 audible, No added heart sounds, No signs of ankle edema, or volume overload, No evidence of JVD, No crackles  GI:  Bowel sounds normal, Soft, No tenderness, No masses, No gross visceromegaly   :  No costovertebral angle tenderness   Musculoskeletal:  No edema, no tenderness, no deformities. Back- no tenderness  Integument:  Well hydrated, no rash   Lymphatic:  No lymphadenopathy noted   Neurologic:  Alert & oriented x 3, CN 2-12 normal, normal motor function, normal sensory function, no focal deficits noted   Psychiatric:  Speech and behavior appropriate       Medical decision making and Data review:  DATA:  No results found for: TROPONINT  BNP:  No results found for: PROBNP  PT/INR:  No results found for: PTINR  No results found for: LABA1C  Lab Results   Component Value Date    CHOL 224 (H) 06/15/2021    TRIG 67 06/15/2021    HDL 70 06/15/2021    LDLCALC 99 07/21/2011    LDLDIRECT 144 (H) 06/15/2021     Lab Results   Component Value Date    ALT 13 06/15/2021    AST 15 06/15/2021     No results for input(s): WBC, HGB, HCT, MCV, PLT in the last 72 hours.   TSH: No results found for: TSH  Lab Results   Component Value Date    AST 15 06/15/2021    ALT 13 06/15/2021    BILITOT 0.3 06/15/2021 ALKPHOS 97 06/15/2021     No results found for: PROBNP  No results found for: LABA1C  Lab Results   Component Value Date    WBC 7.9 06/15/2021    HGB 15.7 06/15/2021    HCT 48.9 (H) 06/15/2021     06/15/2021     All labs, medications and tests reviewed by myself including data and history from outside source , patient and available family . Assessment & Plan:      1. Other fatigue    2. Mixed hyperlipidemia    3. Tobacco abuse         Other fatigue  Less likely to be cardiac etiology for severe exhaustion. But will get 48-hour monitor to see for heart rate. Also get a treadmill stress to see heart rate response report for sleep apnea evaluation and get an echo to rule out structural heart disease. Explained that her symptoms could be related to post Covid fatigue or low vitamin D levels     Dyslipidemia :  All available lab work was reviewed. Patient was advised to repeat lab work before next visit. Necessary orders were placed , instructions given by myself       Counseled extensively and medication compliance urged. We discussed that for the  prevention of ASCVD our  goal is aggressive risk modification. Patient is encouraged to exercise if they can , educated about  brisk walk for 30 minutes  at least 3 to 4 times a week if there are no physical limitations  Various goals were discussed and questions answered. Continue current medications. Appropriate prescriptions are addressed and refills ordered. Questions answered and patient verbalizes understanding. Call for any problems, questions, or concerns. Greater than 60 % of time spent counseling besides reviewing data and images     Continue all other medications of all above medical condition listed as is. Return in about 3 months (around 10/1/2021).     Please note this report has been partially produced using speech recognition software and may contain errors related to that system including errors in grammar, punctuation, and spelling, as well as words and phrases that may be inappropriate. If there are any questions or concerns please feel free to contact the dictating provider for clarification.

## 2021-07-07 ENCOUNTER — TELEPHONE (OUTPATIENT)
Dept: CARDIOLOGY CLINIC | Age: 56
End: 2021-07-07

## 2021-07-07 NOTE — TELEPHONE ENCOUNTER
LM for patient to contact office for message. Message: Is patient still wearing Holter Monitor? Monitor should have been returned.

## 2021-07-13 NOTE — TELEPHONE ENCOUNTER
LM on VM again today, asking pt if she returned monitor to [employer] because we didn't want her to be charged for it.

## 2021-07-21 PROCEDURE — 93227 XTRNL ECG REC<48 HR R&I: CPT | Performed by: INTERNAL MEDICINE

## 2021-07-22 ENCOUNTER — TELEPHONE (OUTPATIENT)
Dept: CARDIOLOGY CLINIC | Age: 56
End: 2021-07-22

## 2021-07-22 ENCOUNTER — OFFICE VISIT (OUTPATIENT)
Dept: FAMILY MEDICINE CLINIC | Age: 56
End: 2021-07-22
Payer: COMMERCIAL

## 2021-07-22 VITALS
WEIGHT: 164 LBS | SYSTOLIC BLOOD PRESSURE: 126 MMHG | HEIGHT: 67 IN | OXYGEN SATURATION: 99 % | HEART RATE: 98 BPM | BODY MASS INDEX: 25.74 KG/M2 | DIASTOLIC BLOOD PRESSURE: 70 MMHG

## 2021-07-22 DIAGNOSIS — Z12.2 ENCOUNTER FOR SCREENING FOR LUNG CANCER: ICD-10-CM

## 2021-07-22 DIAGNOSIS — Z13.31 POSITIVE DEPRESSION SCREENING: ICD-10-CM

## 2021-07-22 DIAGNOSIS — F32.A ANXIETY AND DEPRESSION: ICD-10-CM

## 2021-07-22 DIAGNOSIS — F41.9 ANXIETY AND DEPRESSION: ICD-10-CM

## 2021-07-22 DIAGNOSIS — R53.83 FATIGUE, UNSPECIFIED TYPE: Primary | ICD-10-CM

## 2021-07-22 DIAGNOSIS — Z87.891 PERSONAL HISTORY OF TOBACCO USE: ICD-10-CM

## 2021-07-22 PROCEDURE — 99212 OFFICE O/P EST SF 10 MIN: CPT | Performed by: FAMILY MEDICINE

## 2021-07-22 PROCEDURE — G0296 VISIT TO DETERM LDCT ELIG: HCPCS | Performed by: FAMILY MEDICINE

## 2021-07-22 PROCEDURE — G8431 POS CLIN DEPRES SCRN F/U DOC: HCPCS | Performed by: FAMILY MEDICINE

## 2021-07-22 RX ORDER — SERTRALINE HYDROCHLORIDE 25 MG/1
25 TABLET, FILM COATED ORAL DAILY
Qty: 30 TABLET | Refills: 3 | Status: SHIPPED | OUTPATIENT
Start: 2021-07-22 | End: 2021-09-14

## 2021-07-22 ASSESSMENT — PATIENT HEALTH QUESTIONNAIRE - PHQ9
8. MOVING OR SPEAKING SO SLOWLY THAT OTHER PEOPLE COULD HAVE NOTICED. OR THE OPPOSITE, BEING SO FIGETY OR RESTLESS THAT YOU HAVE BEEN MOVING AROUND A LOT MORE THAN USUAL: 3
7. TROUBLE CONCENTRATING ON THINGS, SUCH AS READING THE NEWSPAPER OR WATCHING TELEVISION: 3
SUM OF ALL RESPONSES TO PHQ QUESTIONS 1-9: 22
SUM OF ALL RESPONSES TO PHQ QUESTIONS 1-9: 22
4. FEELING TIRED OR HAVING LITTLE ENERGY: 3
SUM OF ALL RESPONSES TO PHQ QUESTIONS 1-9: 22
SUM OF ALL RESPONSES TO PHQ9 QUESTIONS 1 & 2: 6
2. FEELING DOWN, DEPRESSED OR HOPELESS: 3
3. TROUBLE FALLING OR STAYING ASLEEP: 3
1. LITTLE INTEREST OR PLEASURE IN DOING THINGS: 3
6. FEELING BAD ABOUT YOURSELF - OR THAT YOU ARE A FAILURE OR HAVE LET YOURSELF OR YOUR FAMILY DOWN: 1
5. POOR APPETITE OR OVEREATING: 3
9. THOUGHTS THAT YOU WOULD BE BETTER OFF DEAD, OR OF HURTING YOURSELF: 0
10. IF YOU CHECKED OFF ANY PROBLEMS, HOW DIFFICULT HAVE THESE PROBLEMS MADE IT FOR YOU TO DO YOUR WORK, TAKE CARE OF THINGS AT HOME, OR GET ALONG WITH OTHER PEOPLE: 3

## 2021-07-22 ASSESSMENT — ENCOUNTER SYMPTOMS
VOMITING: 0
CHEST TIGHTNESS: 0
DIARRHEA: 0
BACK PAIN: 0
NAUSEA: 0
COUGH: 0
SHORTNESS OF BREATH: 0
CONSTIPATION: 0
ABDOMINAL PAIN: 0

## 2021-07-22 ASSESSMENT — COLUMBIA-SUICIDE SEVERITY RATING SCALE - C-SSRS
1. WITHIN THE PAST MONTH, HAVE YOU WISHED YOU WERE DEAD OR WISHED YOU COULD GO TO SLEEP AND NOT WAKE UP?: NO
2. HAVE YOU ACTUALLY HAD ANY THOUGHTS OF KILLING YOURSELF?: NO
6. HAVE YOU EVER DONE ANYTHING, STARTED TO DO ANYTHING, OR PREPARED TO DO ANYTHING TO END YOUR LIFE?: NO

## 2021-07-22 NOTE — PROGRESS NOTES
Shania Radhaantonia  1965 07/22/21    Chief Complaint   Patient presents with    1 Month Follow-Up     Regarding her fatigue and tiredness           Patient here for 1 month follow-up regarding fatigue, tired, blood work it did show she has low vitamin B12 and low vitamin D, patient is still feeling fatigue tired, she does not sleep well she does have some anxiety and depression. Cholesterol borderline above normal.  Her blood work are within normal limits.       Past Medical History:   Diagnosis Date    Dental abscess [facility] ED    Pneumonia 1988    Psoriasis     Spondylolysis 9-10-12    Bilateral L5 pars defects, CT done 9-10-12 [place] [facility] [place]     Past Surgical History:   Procedure Laterality Date    CHOLECYSTECTOMY  1988    [place]   [place]    HYSTERECTOMY  2004    benign tumor right ovary    HYSTERECTOMY, VAGINAL      TUBAL LIGATION  1993     Family History   Problem Relation Age of Onset    Cancer Mother     Diabetes Mother     High Blood Pressure Mother     Obesity Mother     High Cholesterol Mother     Allergies Father     Diabetes Son     Depression Son     Stroke Maternal Grandfather      Social History     Socioeconomic History    Marital status: Single     Spouse name: Not on file    Number of children: Not on file    Years of education: Not on file    Highest education level: Not on file   Occupational History    Not on file   Tobacco Use    Smoking status: Current Every Day Smoker     Packs/day: 1.50     Years: 25.00     Pack years: 37.50     Start date: 12/19/1980    Smokeless tobacco: Never Used   Vaping Use    Vaping Use: Never used   Substance and Sexual Activity    Alcohol use: Yes     Comment: occassionally    Drug use: No    Sexual activity: Not Currently   Other Topics Concern    Not on file   Social History Narrative    Not on file     Social Determinants of Health     Financial Resource Strain:  Difficulty of Paying Living Expenses:    Food Insecurity:     Worried About Running Out of Food in the Last Year:     Ran Out of Food in the Last Year:    Transportation Needs:     Lack of Transportation (Medical):  Lack of Transportation (Non-Medical):    Physical Activity:     Days of Exercise per Week:     Minutes of Exercise per Session:    Stress:     Feeling of Stress :    Social Connections:     Frequency of Communication with Friends and Family:     Frequency of Social Gatherings with Friends and Family:     Attends Scientologist Services:     Active Member of Clubs or Organizations:     Attends Club or Organization Meetings:     Marital Status:    Intimate Partner Violence:     Fear of Current or Ex-Partner:     Emotionally Abused:     Physically Abused:     Sexually Abused: Allergies   Allergen Reactions    Codeine      Stomach cramps    Tramadol     Zofran [Ondansetron Hcl] Other (See Comments)     Severe abd pain     Current Outpatient Medications   Medication Sig Dispense Refill    sertraline (ZOLOFT) 25 MG tablet Take 1 tablet by mouth daily 30 tablet 3    ergocalciferol (DRISDOL) 1.25 MG (12836 UT) capsule Take 1 capsule by mouth once a week 4 capsule 5    montelukast (SINGULAIR) 10 MG tablet Take 1 tablet by mouth daily 30 tablet 3    loratadine (CLARITIN) 10 MG tablet Take 1 tablet by mouth daily 30 tablet 1    Clobetasol Propionate 0.05 % SHAM Apply 1 Dose topically daily as needed (psoriasis) 1 Bottle 1    albuterol sulfate  (90 Base) MCG/ACT inhaler INHALE 2 PUFFS INTO THE LUNGS 4 TIMES DAILY AS NEEDED FOR WHEEZING 6.7 Inhaler 0    azelastine (ASTELIN) 0.1 % nasal spray 1 spray by Nasal route 2 times daily Use in each nostril as directed 1 Bottle 0    fluticasone (FLONASE) 50 MCG/ACT nasal spray 2 sprays by Each Nostril route daily 1 Bottle 0    Naproxen Sodium (ALEVE) 220 MG CAPS Take 1 tablet by mouth as needed for Pain.       Multiple Vitamin (MULTIVITAMIN PO) Take 1 tablet by mouth daily. No current facility-administered medications for this visit. Review of Systems   Constitutional: Positive for activity change and fatigue. Negative for appetite change, chills and fever. HENT: Negative for congestion. Respiratory: Negative for cough, chest tightness and shortness of breath. Cardiovascular: Negative for chest pain and leg swelling. Gastrointestinal: Negative for abdominal pain, constipation, diarrhea, nausea and vomiting. Genitourinary: Negative for dysuria and frequency. Musculoskeletal: Negative for back pain. Neurological: Negative for dizziness and headaches. Psychiatric/Behavioral: Negative for agitation and behavioral problems. The patient is not nervous/anxious.         Lab Results   Component Value Date    WBC 7.9 06/15/2021    HGB 15.7 06/15/2021    HCT 48.9 (H) 06/15/2021    MCV 91.4 06/15/2021     06/15/2021     Lab Results   Component Value Date     06/15/2021    K 4.7 06/15/2021     06/15/2021    CO2 27 06/15/2021    BUN 18 06/15/2021    CREATININE 0.8 06/15/2021    GLUCOSE 98 04/22/2020    CALCIUM 9.6 06/15/2021    PROT 6.9 06/15/2021    LABALBU 4.3 06/15/2021    BILITOT 0.3 06/15/2021    ALKPHOS 97 06/15/2021    AST 15 06/15/2021    ALT 13 06/15/2021    LABGLOM >60 06/15/2021    GFRAA >60 06/15/2021     Lab Results   Component Value Date    CHOL 224 (H) 06/15/2021    CHOL 156 04/22/2020    CHOL 189 01/03/2018     Lab Results   Component Value Date    TRIG 67 06/15/2021    TRIG 75 04/22/2020    TRIG 140 01/03/2018     Lab Results   Component Value Date    HDL 70 06/15/2021    HDL 50 04/22/2020    HDL 43 01/03/2018     Lab Results   Component Value Date    LDLCALC 99 07/21/2011     No results found for: LABA1C  Lab Results   Component Value Date    TSHHS 1.710 06/15/2021         /70 (Site: Left Upper Arm, Position: Sitting, Cuff Size: Medium Adult)   Pulse 98   Ht 5' 7\" (1.702 m)   Wt 164 lb (74.4 kg)   SpO2 99%   BMI 25.69 kg/m²     BP Readings from Last 3 Encounters:   07/22/21 126/70   07/01/21 120/70   06/23/21 117/81       Wt Readings from Last 3 Encounters:   07/22/21 164 lb (74.4 kg)   07/01/21 166 lb (75.3 kg)   06/23/21 169 lb (76.7 kg)         Physical Exam  Constitutional:       General: She is not in acute distress. Appearance: Normal appearance. She is well-developed. She is not ill-appearing or diaphoretic. Comments: irritable   HENT:      Head: Normocephalic and atraumatic. Eyes:      General: No scleral icterus. Pupils: Pupils are equal, round, and reactive to light. Cardiovascular:      Rate and Rhythm: Normal rate and regular rhythm. Heart sounds: Normal heart sounds. No murmur heard. Pulmonary:      Effort: Pulmonary effort is normal.      Breath sounds: Normal breath sounds. No wheezing. Musculoskeletal:         General: Normal range of motion. Cervical back: Normal range of motion and neck supple. No rigidity. Right lower leg: No edema. Left lower leg: No edema. Skin:     Findings: No rash. Neurological:      General: No focal deficit present. Mental Status: She is alert and oriented to person, place, and time. Psychiatric:         Mood and Affect: Mood is anxious. Mood is not depressed. Affect is angry. Affect is not labile or flat. Speech: Speech is rapid and pressured. Behavior: Behavior normal.         Cognition and Memory: Cognition normal.         ASSESSMENT/ PLAN:    1. Fatigue, unspecified type  -We will continue taking the B12 and vitamin D and we will check:  - Sedimentation Rate; Future  - Rheumatoid Factor; Future  - JOSÉ MIGUEL; Future    2. Positive depression screening  -We will start her on medication  - Positive Screen for Clinical Depression with a Documented Follow-up Plan     3.  Personal history of tobacco use  - OR VISIT TO DISCUSS LUNG CA SCREEN W LDCT  - CT Lung Screen (Annual); Future    4. Anxiety and depression  -We will try start her on Zoloft  - sertraline (ZOLOFT) 25 MG tablet; Take 1 tablet by mouth daily  Dispense: 30 tablet; Refill: 3            -Has no insurance today, will do the blood work after she got the insurance  - [de-identified] old blood work reviewed with the patient  - Appropriate prescription are addressed. - After visit summery provided. - Questions answered and patient verbalizes understanding.  - Call for any problem, questions, or concerns.  - RTC if symptoms worse. Return in about 8 weeks (around 9/16/2021). Low Dose CT (LDCT) Lung Screening criteria met   Age 50-69   Pack year smoking >30   Still smoking or less than 15 year since quit   No sign or symptoms of lung cancer   > 11 months since last LDCT     Risks and benefits of lung cancer screening with LDCT scans discussed:    Significance of positive screen - False-positive LDCT results often occur. 95% of all positive results do not lead to a diagnosis of cancer. Usually further imaging can resolve most false-positive results; however, some patients may require invasive procedures. Over diagnosis risk - 10% to 12% of screen-detected lung cancer cases are over diagnosedthat is, the cancer would not have been detected in the patient's lifetime without the screening. Need for follow up screens annually to continue lung cancer screening effectiveness     Risks associated with radiation from annual LDCT- Radiation exposure is about the same as for a mammogram, which is about 1/3 of the annual background radiation exposure from everyday life. Starting screening at age 54 is not likely to increase cancer risk from radiation exposure. Patients with comorbidities resulting in life expectancy of < 10 years, or that would preclude treatment of an abnormality identified on CT, should not be screened due to lack of benefit.     To obtain maximal benefit from this screening, smoking cessation and long-term abstinence from smoking is critical

## 2021-07-22 NOTE — TELEPHONE ENCOUNTER
24-hour Holter monitor with maximal heart rate 124 average heart rate of 96 lowest heart rate of 63 there was 0 ventricular ectopy longest R-R interval was 1.1-second episode of SVT of 3-4 beats at 101 bpm which was not reported as associated with any symptoms patient reported symptoms of syncope dizziness unfortunately there was no EKG or telemetry monitoring during the time, was artifact  Clinical correlation needed    LM for patient to contact office for message.

## 2021-08-21 PROBLEM — Z12.2 ENCOUNTER FOR SCREENING FOR LUNG CANCER: Status: RESOLVED | Noted: 2021-07-22 | Resolved: 2021-08-21

## 2021-08-26 NOTE — TELEPHONE ENCOUNTER
Called again to give test results and ask pt to schedule Treadmill and ECHO before her upcoming appt in Oct. LM on VM

## 2021-09-14 ENCOUNTER — OFFICE VISIT (OUTPATIENT)
Dept: FAMILY MEDICINE CLINIC | Age: 56
End: 2021-09-14
Payer: COMMERCIAL

## 2021-09-14 VITALS
BODY MASS INDEX: 25.87 KG/M2 | OXYGEN SATURATION: 97 % | HEART RATE: 89 BPM | WEIGHT: 165.2 LBS | SYSTOLIC BLOOD PRESSURE: 139 MMHG | DIASTOLIC BLOOD PRESSURE: 79 MMHG

## 2021-09-14 DIAGNOSIS — R53.83 FATIGUE, UNSPECIFIED TYPE: ICD-10-CM

## 2021-09-14 DIAGNOSIS — E55.9 VITAMIN D DEFICIENCY: ICD-10-CM

## 2021-09-14 DIAGNOSIS — F41.9 ANXIETY AND DEPRESSION: Primary | ICD-10-CM

## 2021-09-14 DIAGNOSIS — B00.1 COLD SORE: ICD-10-CM

## 2021-09-14 DIAGNOSIS — E53.8 VITAMIN B12 DEFICIENCY: ICD-10-CM

## 2021-09-14 DIAGNOSIS — F32.A ANXIETY AND DEPRESSION: Primary | ICD-10-CM

## 2021-09-14 DIAGNOSIS — E78.2 MIXED HYPERLIPIDEMIA: ICD-10-CM

## 2021-09-14 PROCEDURE — 3017F COLORECTAL CA SCREEN DOC REV: CPT | Performed by: FAMILY MEDICINE

## 2021-09-14 PROCEDURE — G8419 CALC BMI OUT NRM PARAM NOF/U: HCPCS | Performed by: FAMILY MEDICINE

## 2021-09-14 PROCEDURE — G8427 DOCREV CUR MEDS BY ELIG CLIN: HCPCS | Performed by: FAMILY MEDICINE

## 2021-09-14 PROCEDURE — 4004F PT TOBACCO SCREEN RCVD TLK: CPT | Performed by: FAMILY MEDICINE

## 2021-09-14 PROCEDURE — 99214 OFFICE O/P EST MOD 30 MIN: CPT | Performed by: FAMILY MEDICINE

## 2021-09-14 PROCEDURE — G9899 SCRN MAM PERF RSLTS DOC: HCPCS | Performed by: FAMILY MEDICINE

## 2021-09-14 RX ORDER — VALACYCLOVIR HYDROCHLORIDE 1 G/1
2000 TABLET, FILM COATED ORAL 2 TIMES DAILY
Qty: 12 TABLET | Refills: 5 | Status: SHIPPED | OUTPATIENT
Start: 2021-09-14 | End: 2021-09-15

## 2021-09-14 RX ORDER — CYANOCOBALAMIN 1000 UG/ML
1000 INJECTION INTRAMUSCULAR; SUBCUTANEOUS
Qty: 1 ML | Refills: 5 | Status: SHIPPED | OUTPATIENT
Start: 2021-09-14

## 2021-09-14 NOTE — PROGRESS NOTES
Molly Christian  1965 09/19/21    Chief Complaint   Patient presents with    Other     Patient here for 8 week F/U    Anxiety    Depression           Patient here for f/u visit regarding her anxiety and depression, we did order zoloft for her last visit, patient didn't take it, and would like to get the B12 shot RX, patient has no insurance before, denies suicidal idea or plan, didn;t do the blood work yet, still having multiple joint pain, and would like to see a rhematology. Needs medication refill for her cold sore infection. Past Medical History:   Diagnosis Date    Dental abscess 01/01/2015    [facility] ED    History of cardiac monitoring 07/01/2021    Lowest HR=63. Max=124. Avg.=96/ 1.1 second episode of SVT. Artifact noted.  Pneumonia 01/01/1988    Psoriasis     Spondylolysis 09/10/2012    Bilateral L5 pars defects, CT done 9-10-12 [facility]     Past Surgical History:   Procedure Laterality Date    CHOLECYSTECTOMY  1988    gall stones   [place]    HYSTERECTOMY  2004    benign tumor right ovary    HYSTERECTOMY, VAGINAL      TUBAL LIGATION  1993     Family History   Problem Relation Age of Onset    Cancer Mother     Diabetes Mother     High Blood Pressure Mother     Obesity Mother     High Cholesterol Mother     Allergies Father     Diabetes Son     Depression Son     Stroke Maternal Grandfather      Social History     Socioeconomic History    Marital status: Single     Spouse name: Not on file    Number of children: Not on file    Years of education: Not on file    Highest education level: Not on file   Occupational History    Not on file   Tobacco Use    Smoking status: Current Every Day Smoker     Packs/day: 1.50     Years: 25.00     Pack years: 37.50     Start date: 12/19/1980    Smokeless tobacco: Never Used   Vaping Use    Vaping Use: Never used   Substance and Sexual Activity    Alcohol use:  Yes Comment: occassionally    Drug use: No    Sexual activity: Not Currently   Other Topics Concern    Not on file   Social History Narrative    Not on file     Social Determinants of Health     Financial Resource Strain:     Difficulty of Paying Living Expenses:    Food Insecurity:     Worried About Running Out of Food in the Last Year:     [address] in the Last Year:    Transportation Needs:     Lack of Transportation (Medical):  Lack of Transportation (Non-Medical):    Physical Activity:     Days of Exercise per Week:     Minutes of Exercise per Session:    Stress:     Feeling of Stress :    Social Connections:     Frequency of Communication with Friends and Family:     Frequency of Social Gatherings with Friends and Family:     Attends Taoist Services:     Active Member of Clubs or Organizations:     Attends Club or Organization Meetings:     Marital Status:    Intimate Partner Violence:     Fear of Current or Ex-Partner:     Emotionally Abused:     Physically Abused:     Sexually Abused:         Allergies   Allergen Reactions    Codeine      Stomach cramps    Tramadol     Zofran [Ondansetron Hcl] Other (See Comments)     Severe abd pain     Current Outpatient Medications   Medication Sig Dispense Refill    cyanocobalamin 1000 MCG/ML injection Inject 1 mL into the muscle every 30 days 1 mL 5    montelukast (SINGULAIR) 10 MG tablet Take 1 tablet by mouth daily 30 tablet 3    Clobetasol Propionate 0.05 % SHAM Apply 1 Dose topically daily as needed (psoriasis) 1 Bottle 1    albuterol sulfate  (90 Base) MCG/ACT inhaler INHALE 2 PUFFS INTO THE LUNGS 4 TIMES DAILY AS NEEDED FOR WHEEZING 6.7 Inhaler 0    azelastine (ASTELIN) 0.1 % nasal spray 1 spray by Nasal route 2 times daily Use in each nostril as directed 1 Bottle 0    fluticasone (FLONASE) 50 MCG/ACT nasal spray 2 sprays by Each Nostril route daily 1 Bottle 0    Naproxen Sodium (ALEVE) 220 MG CAPS Take 1 tablet by Component Value Date    TRIG 67 06/15/2021    TRIG 75 04/22/2020    TRIG 140 01/03/2018     Lab Results   Component Value Date    HDL 70 06/15/2021    HDL 50 04/22/2020    HDL 43 01/03/2018     Lab Results   Component Value Date    LDLCALC 99 07/21/2011     No results found for: LABA1C  Lab Results   Component Value Date    TSHHS 1.710 06/15/2021         /79 (Site: Left Upper Arm, Position: Sitting, Cuff Size: Large Adult)   Pulse 89   Wt 165 lb 3.2 oz (74.9 kg)   SpO2 97%   BMI 25.87 kg/m²     BP Readings from Last 3 Encounters:   09/14/21 139/79   07/22/21 126/70   07/01/21 120/70       Wt Readings from Last 3 Encounters:   09/14/21 165 lb 3.2 oz (74.9 kg)   07/22/21 164 lb (74.4 kg)   07/01/21 166 lb (75.3 kg)         Physical Exam  Constitutional:       General: She is not in acute distress. Appearance: Normal appearance. She is well-developed. She is not ill-appearing or diaphoretic. HENT:      Head: Normocephalic and atraumatic. Eyes:      General: No scleral icterus. Pupils: Pupils are equal, round, and reactive to light. Cardiovascular:      Rate and Rhythm: Normal rate and regular rhythm. Heart sounds: Normal heart sounds. No murmur heard. Pulmonary:      Effort: Pulmonary effort is normal.      Breath sounds: Normal breath sounds. No wheezing. Musculoskeletal:         General: Normal range of motion. Cervical back: Normal range of motion and neck supple. No rigidity. Right lower leg: No edema. Left lower leg: No edema. Skin:     Findings: No rash. Neurological:      General: No focal deficit present. Mental Status: She is alert and oriented to person, place, and time. Psychiatric:         Mood and Affect: Mood is anxious. Mood is not depressed. Affect is angry. Affect is not labile or flat. Speech: Speech is rapid and pressured.          Behavior: Behavior normal.         Cognition and Memory: Cognition normal.         ASSESSMENT/ PLAN:    1. Anxiety and depression  - didn't take the zoloft    2. Fatigue, unspecified type  - need to do the blood work    3. Cold sore  - Rx done  - valACYclovir (VALTREX) 1 g tablet; Take 2 tablets by mouth 2 times daily for 1 day For oral ulcer flares  Dispense: 12 tablet; Refill: 5    4. Vitamin B12 deficiency  - start:  - cyanocobalamin 1000 MCG/ML injection; Inject 1 mL into the muscle every 30 days  Dispense: 1 mL; Refill: 5    5. Vitamin D deficiency  - on vit D    6. Mixed hyperlipidemia  - just diet control              - All old blood work reviewed with the patient  - Appropriate prescription are addressed. - After visit summery provided. - Questions answered and patient verbalizes understanding.  - Call for any problem, questions, or concerns.  - RTC if symptoms worse. Return in about 3 months (around 12/14/2021).

## 2021-09-17 ENCOUNTER — HOSPITAL ENCOUNTER (OUTPATIENT)
Age: 56
Discharge: HOME | End: 2021-09-17
Payer: COMMERCIAL

## 2021-09-17 LAB — ERYTHROCYTE SEDIMENTATION RATE: 5 MM/HR (ref 0–30)

## 2021-09-17 PROCEDURE — 86039 ANTINUCLEAR ANTIBODIES (ANA): CPT

## 2021-09-17 PROCEDURE — 86038 ANTINUCLEAR ANTIBODIES: CPT

## 2021-09-17 PROCEDURE — 85652 RBC SED RATE AUTOMATED: CPT

## 2021-09-17 PROCEDURE — 86430 RHEUMATOID FACTOR TEST QUAL: CPT

## 2021-09-17 PROCEDURE — 36415 COLL VENOUS BLD VENIPUNCTURE: CPT

## 2021-09-19 ASSESSMENT — ENCOUNTER SYMPTOMS
DIARRHEA: 0
BACK PAIN: 0
SHORTNESS OF BREATH: 0
CHEST TIGHTNESS: 0
CONSTIPATION: 0
COUGH: 0
VOMITING: 0
NAUSEA: 0
ABDOMINAL PAIN: 0

## 2021-09-20 LAB
ANTI-NUCLEAR ANTIBODY (ANA): DETECTED
RHEUMATOID FACTOR: <10 IU/ML (ref 0–14)

## 2021-09-21 LAB
ANA PATTERN: ABNORMAL
ANA TITER: ABNORMAL
ANTINUCLEAR ANTIBODY, HEP-2, IGG: DETECTED
INTERPRETATION: ABNORMAL

## 2021-09-24 ENCOUNTER — TELEPHONE (OUTPATIENT)
Dept: CARDIOLOGY CLINIC | Age: 56
End: 2021-09-24

## 2021-10-06 ENCOUNTER — TELEPHONE (OUTPATIENT)
Dept: FAMILY MEDICINE CLINIC | Age: 56
End: 2021-10-06

## 2021-11-02 ENCOUNTER — HOSPITAL ENCOUNTER (OUTPATIENT)
Age: 56
Discharge: HOME | End: 2021-11-02
Payer: COMMERCIAL

## 2021-11-02 LAB
ALT SERPL-CCNC: 15 U/L (ref 10–40)
AST SERPL-CCNC: 13 IU/L (ref 15–37)
BASOPHILS ABSOLUTE: 0 K/CU MM
BASOPHILS RELATIVE PERCENT: 0.4 % (ref 0–1)
BUN BLDV-MCNC: 19 MG/DL (ref 6–23)
CREAT SERPL-MCNC: 0.9 MG/DL (ref 0.6–1.1)
DIFFERENTIAL TYPE: ABNORMAL
EOSINOPHILS ABSOLUTE: 0 K/CU MM
EOSINOPHILS RELATIVE PERCENT: 0.4 % (ref 0–3)
GFR AFRICAN AMERICAN: >60 ML/MIN/1.73M2
GFR NON-AFRICAN AMERICAN: >60 ML/MIN/1.73M2
HBV SURFACE AB TITR SER: <3.5 {TITER}
HCT VFR BLD CALC: 45.7 % (ref 37–47)
HEMOGLOBIN: 14.8 GM/DL (ref 12.5–16)
HEPATITIS B SURFACE ANTIGEN: NON REACTIVE
HEPATITIS C ANTIBODY: NON REACTIVE
IMMATURE NEUTROPHIL %: 0.4 % (ref 0–0.43)
LYMPHOCYTES ABSOLUTE: 2.5 K/CU MM
LYMPHOCYTES RELATIVE PERCENT: 21.9 % (ref 24–44)
MCH RBC QN AUTO: 28.6 PG (ref 27–31)
MCHC RBC AUTO-ENTMCNC: 32.4 % (ref 32–36)
MCV RBC AUTO: 88.4 FL (ref 78–100)
MONOCYTES ABSOLUTE: 0.9 K/CU MM
MONOCYTES RELATIVE PERCENT: 8.3 % (ref 0–4)
PDW BLD-RTO: 13.9 % (ref 11.7–14.9)
PLATELET # BLD: 322 K/CU MM (ref 140–440)
PMV BLD AUTO: 10.6 FL (ref 7.5–11.1)
RBC # BLD: 5.17 M/CU MM (ref 4.2–5.4)
SEGMENTED NEUTROPHILS ABSOLUTE COUNT: 7.7 K/CU MM
SEGMENTED NEUTROPHILS RELATIVE PERCENT: 68.6 % (ref 36–66)
TOTAL CK: 183 IU/L (ref 26–140)
TOTAL IMMATURE NEUTOROPHIL: 0.04 K/CU MM
WBC # BLD: 11.2 K/CU MM (ref 4–10.5)

## 2021-11-02 PROCEDURE — 86803 HEPATITIS C AB TEST: CPT

## 2021-11-02 PROCEDURE — 86812 HLA TYPING A B OR C: CPT

## 2021-11-02 PROCEDURE — 84450 TRANSFERASE (AST) (SGOT): CPT

## 2021-11-02 PROCEDURE — 86707 HEPATITIS BE ANTIBODY: CPT

## 2021-11-02 PROCEDURE — 84520 ASSAY OF UREA NITROGEN: CPT

## 2021-11-02 PROCEDURE — 85025 COMPLETE CBC W/AUTO DIFF WBC: CPT

## 2021-11-02 PROCEDURE — 36415 COLL VENOUS BLD VENIPUNCTURE: CPT

## 2021-11-02 PROCEDURE — 87340 HEPATITIS B SURFACE AG IA: CPT

## 2021-11-02 PROCEDURE — 86706 HEP B SURFACE ANTIBODY: CPT

## 2021-11-02 PROCEDURE — 82565 ASSAY OF CREATININE: CPT

## 2021-11-02 PROCEDURE — 84460 ALANINE AMINO (ALT) (SGPT): CPT

## 2021-11-02 PROCEDURE — 82550 ASSAY OF CK (CPK): CPT

## 2021-11-02 PROCEDURE — 87350 HEPATITIS BE AG IA: CPT

## 2021-11-02 PROCEDURE — 86704 HEP B CORE ANTIBODY TOTAL: CPT

## 2021-11-04 LAB
HEPATITIS B CORE TOTAL ANTIBODY: NEGATIVE
HEPATITIS BE ANTIBODY: NEGATIVE
HEPATITIS BE ANTIGEN: NEGATIVE
HLA B27: NEGATIVE

## 2021-11-16 RX ORDER — MONTELUKAST SODIUM 10 MG/1
TABLET ORAL
Qty: 30 TABLET | Refills: 3 | Status: SHIPPED | OUTPATIENT
Start: 2021-11-16

## 2021-12-08 ENCOUNTER — OFFICE VISIT (OUTPATIENT)
Dept: FAMILY MEDICINE CLINIC | Age: 56
End: 2021-12-08
Payer: COMMERCIAL

## 2021-12-08 VITALS
SYSTOLIC BLOOD PRESSURE: 118 MMHG | BODY MASS INDEX: 25.69 KG/M2 | DIASTOLIC BLOOD PRESSURE: 82 MMHG | WEIGHT: 164 LBS | OXYGEN SATURATION: 97 % | HEART RATE: 98 BPM

## 2021-12-08 DIAGNOSIS — R76.8 ANA POSITIVE: ICD-10-CM

## 2021-12-08 DIAGNOSIS — F41.9 ANXIETY AND DEPRESSION: Primary | ICD-10-CM

## 2021-12-08 DIAGNOSIS — E78.2 MIXED HYPERLIPIDEMIA: ICD-10-CM

## 2021-12-08 DIAGNOSIS — E53.8 B12 DEFICIENCY: ICD-10-CM

## 2021-12-08 DIAGNOSIS — F32.A ANXIETY AND DEPRESSION: Primary | ICD-10-CM

## 2021-12-08 DIAGNOSIS — Z72.0 TOBACCO ABUSE: ICD-10-CM

## 2021-12-08 PROCEDURE — 4004F PT TOBACCO SCREEN RCVD TLK: CPT | Performed by: FAMILY MEDICINE

## 2021-12-08 PROCEDURE — G9899 SCRN MAM PERF RSLTS DOC: HCPCS | Performed by: FAMILY MEDICINE

## 2021-12-08 PROCEDURE — G8419 CALC BMI OUT NRM PARAM NOF/U: HCPCS | Performed by: FAMILY MEDICINE

## 2021-12-08 PROCEDURE — G8427 DOCREV CUR MEDS BY ELIG CLIN: HCPCS | Performed by: FAMILY MEDICINE

## 2021-12-08 PROCEDURE — G8484 FLU IMMUNIZE NO ADMIN: HCPCS | Performed by: FAMILY MEDICINE

## 2021-12-08 PROCEDURE — 3017F COLORECTAL CA SCREEN DOC REV: CPT | Performed by: FAMILY MEDICINE

## 2021-12-08 PROCEDURE — 99214 OFFICE O/P EST MOD 30 MIN: CPT | Performed by: FAMILY MEDICINE

## 2021-12-08 RX ORDER — CYANOCOBALAMIN 1000 UG/ML
1000 INJECTION INTRAMUSCULAR; SUBCUTANEOUS ONCE
Status: COMPLETED | OUTPATIENT
Start: 2021-12-08 | End: 2021-12-08

## 2021-12-08 RX ORDER — NAPROXEN SODIUM 220 MG
1 TABLET ORAL DAILY
Qty: 5 EACH | Refills: 1 | Status: SHIPPED | OUTPATIENT
Start: 2021-12-08

## 2021-12-08 RX ADMIN — CYANOCOBALAMIN 1000 MCG: 1000 INJECTION INTRAMUSCULAR; SUBCUTANEOUS at 15:20

## 2021-12-08 NOTE — PROGRESS NOTES
Alessandra Mini  1965 12/09/21    Chief Complaint   Patient presents with    3 Month Follow-Up    Anxiety    Depression    Hyperlipidemia    Other     vit B12 deficiency           Patient here for 3 months follow-up regarding fatigue,and tired, patient saw Dr Margarita Torrez, who did lots of blood work, didn't f/u after the blood work put her on prednisone, dose not know the dose, still c/o both hands pain and swelling. Not able to work, could not get the B12 shot by herself. Still refuse any medication for her anxiety and depression. And she is not taking cholesterol medications. Past Medical History:   Diagnosis Date    Dental abscess 01/01/2015    [facility] ED    History of cardiac monitoring 07/01/2021    Lowest HR=63. Max=124. Avg.=96/ 1.1 second episode of SVT. Artifact noted.     Pneumonia 01/01/1988    Psoriasis     Spondylolysis 09/10/2012    Bilateral L5 pars defects, CT done 9-10-12 [facility]     Past Surgical History:   Procedure Laterality Date    CHOLECYSTECTOMY  1988    gall [place]   [place]    HYSTERECTOMY  2004    benign tumor right ovary    HYSTERECTOMY, VAGINAL      TUBAL LIGATION  1993     Family History   Problem Relation Age of Onset    Cancer Mother     Diabetes Mother     High Blood Pressure Mother     Obesity Mother     High Cholesterol Mother     Allergies Father     Diabetes Son     Depression Son     Stroke Maternal Grandfather      Social History     Socioeconomic History    Marital status: Single     Spouse name: Not on file    Number of children: Not on file    Years of education: Not on file    Highest education level: Not on file   Occupational History    Not on file   Tobacco Use    Smoking status: Current Every Day Smoker     Packs/day: 1.50     Years: 25.00     Pack years: 37.50     Start date: 12/19/1980    Smokeless tobacco: Never Used   Vaping Use    Vaping Use: Never used   Substance and Sexual Activity    Alcohol use: Yes     Comment: occassionally    Drug use: No    Sexual activity: Not Currently   Other Topics Concern    Not on file   Social History Narrative    Not on file     Social Determinants of Health     Financial Resource Strain:     Difficulty of Paying Living Expenses: Not on file   Food Insecurity:     Worried About Running Out of Food in the Last Year: Not on file    Jeremiah of Food in the Last Year: Not on file   Transportation Needs:     Lack of Transportation (Medical): Not on file    Lack of Transportation (Non-Medical):  Not on file   Physical Activity:     Days of Exercise per Week: Not on file    Minutes of Exercise per Session: Not on file   Stress:     Feeling of Stress : Not on file   Social Connections:     Frequency of Communication with Friends and Family: Not on file    Frequency of Social Gatherings with Friends and Family: Not on file    Attends Confucianist Services: Not on file    Active Member of [address] [place] [address] or Organizations: Not on file    Attends Club or Organization Meetings: Not on file    Marital Status: Not on file   Intimate Partner Violence:     Fear of Current or Ex-Partner: Not on file    Emotionally Abused: Not on file    Physically Abused: Not on file    Sexually Abused: Not on file   Housing Stability:     Unable to Pay for Housing in the Last Year: Not on file    Number of [place] in the Last Year: Not on file    Unstable Housing in the Last Year: Not on file       Allergies   Allergen Reactions    Codeine      Stomach cramps    Tramadol     Zofran [Ondansetron Hcl] Other (See Comments)     Severe abd pain     Current Outpatient Medications   Medication Sig Dispense Refill    Insulin Syringe-Needle U-100 (INSULIN SYRINGE .5CC/31GX5/16\") 31G X 5/16\" 0.5 ML MISC 1 each by Does not apply route daily 5 each 1    montelukast (SINGULAIR) 10 MG tablet TAKE 1 TABLET BY MOUTH EVERY DAY 30 tablet 3    SYRINGE-NEEDLE, DISP, 3 ML (Lisa Bianchi) 23G X 1\" 3 ML MISC Use once a wk 50 each 0    cyanocobalamin 1000 MCG/ML injection Inject 1 mL into the muscle every 30 days 1 mL 5    loratadine (CLARITIN) 10 MG tablet Take 1 tablet by mouth daily 30 tablet 1    Clobetasol Propionate 0.05 % SHAM Apply 1 Dose topically daily as needed (psoriasis) 1 Bottle 1    albuterol sulfate  (90 Base) MCG/ACT inhaler INHALE 2 PUFFS INTO THE LUNGS 4 TIMES DAILY AS NEEDED FOR WHEEZING 6.7 Inhaler 0    azelastine (ASTELIN) 0.1 % nasal spray 1 spray by Nasal route 2 times daily Use in each nostril as directed 1 Bottle 0    fluticasone (FLONASE) 50 MCG/ACT nasal spray 2 sprays by Each Nostril route daily 1 Bottle 0    Naproxen Sodium (ALEVE) 220 MG CAPS Take 1 tablet by mouth as needed for Pain.  Multiple Vitamin (MULTIVITAMIN PO) Take 1 tablet by mouth daily.  ergocalciferol (DRISDOL) 1.25 MG (27886 UT) capsule Take 1 capsule by mouth once a week (Patient not taking: Reported on 9/14/2021) 4 capsule 5     No current facility-administered medications for this visit. Review of Systems   Constitutional: Positive for activity change and fatigue. Negative for appetite change, chills and fever. HENT: Negative for congestion. Respiratory: Negative for cough, chest tightness and shortness of breath. Cardiovascular: Negative for chest pain and leg swelling. Gastrointestinal: Negative for abdominal pain, constipation, diarrhea, nausea and vomiting. Genitourinary: Negative for dysuria and frequency. Musculoskeletal: Positive for arthralgias (multiple joints pain). Negative for back pain. Neurological: Negative for dizziness and headaches. Psychiatric/Behavioral: Negative for agitation and behavioral problems. The patient is not nervous/anxious.         Lab Results   Component Value Date    WBC 11.2 (H) 11/02/2021    HGB 14.8 11/02/2021    HCT 45.7 11/02/2021    MCV 88.4 11/02/2021     11/02/2021     Lab Results supple. No rigidity. Right lower leg: No edema. Left lower leg: No edema. Comments: Both hands swelling at most of the joints   Skin:     Findings: No rash. Neurological:      General: No focal deficit present. Mental Status: She is alert and oriented to person, place, and time. Psychiatric:         Mood and Affect: Mood is anxious. Mood is not depressed. Affect is angry. Affect is not labile or flat. Speech: Speech is rapid and pressured. Behavior: Behavior normal.         Cognition and Memory: Cognition normal.         ASSESSMENT/ PLAN:    1. Anxiety and depression  - refuse medication  - [address] [place], [place], [facility]    2. JOSÉ MIGUEL positive  - f/u with the rheumatology     3. B12 deficiency  - cyanocobalamin injection 1,000 mcg  - Insulin Syringe-Needle U-100 (INSULIN SYRINGE .5CC/31GX5/16\") 31G X 5/16\" 0.5 ML MISC; 1 each by Does not apply route daily  Dispense: 5 each; Refill: 1    4. Tobacco abuse  - encourage smoking cessation    5. Mixed hyperlipidemia  - diet control              - All old blood work reviewed with the patient  - Appropriate prescription are addressed. - After visit summery provided. - Questions answered and patient verbalizes understanding.  - Call for any problem, questions, or concerns.  - RTC if symptoms worse. Return in about 3 months (around 3/8/2022).

## 2021-12-09 PROBLEM — R76.8 ANA POSITIVE: Status: ACTIVE | Noted: 2021-12-09

## 2021-12-09 ASSESSMENT — ENCOUNTER SYMPTOMS
NAUSEA: 0
COUGH: 0
CONSTIPATION: 0
BACK PAIN: 0
VOMITING: 0
DIARRHEA: 0
CHEST TIGHTNESS: 0
ABDOMINAL PAIN: 0
SHORTNESS OF BREATH: 0

## 2022-01-26 ENCOUNTER — VIRTUAL VISIT (OUTPATIENT)
Dept: FAMILY MEDICINE CLINIC | Age: 57
End: 2022-01-26
Payer: COMMERCIAL

## 2022-01-26 DIAGNOSIS — J02.9 ACUTE PHARYNGITIS, UNSPECIFIED ETIOLOGY: Primary | ICD-10-CM

## 2022-01-26 PROCEDURE — 3017F COLORECTAL CA SCREEN DOC REV: CPT | Performed by: FAMILY MEDICINE

## 2022-01-26 PROCEDURE — G8427 DOCREV CUR MEDS BY ELIG CLIN: HCPCS | Performed by: FAMILY MEDICINE

## 2022-01-26 PROCEDURE — 99213 OFFICE O/P EST LOW 20 MIN: CPT | Performed by: FAMILY MEDICINE

## 2022-01-26 RX ORDER — AMOXICILLIN 500 MG/1
500 CAPSULE ORAL 3 TIMES DAILY
Qty: 30 CAPSULE | Refills: 0 | Status: SHIPPED | OUTPATIENT
Start: 2022-01-26 | End: 2022-02-05

## 2022-01-26 NOTE — PROGRESS NOTES
2022    TELEHEALTH EVALUATION -- Audio/Visual (During GFTKK-20 public health emergency)    Chief Complaint   Patient presents with    Other     PAINFUL BLISTERS ON BACK OF TONGUE          HPI:    Cristino Bain (:  1965) has requested an audio/video evaluation for the following concern(s):    Patient seen today c/o:    Pharyngitis  This is a new problem. Episode onset: x 4 days. The problem occurs constantly. The problem has been gradually worsening. Associated symptoms include congestion, coughing, fatigue, a rash (blister on back of the toungue) and a sore throat. Pertinent negatives include no abdominal pain, chest pain, chills, fever, headaches, nausea, neck pain, numbness, swollen glands, urinary symptoms, vomiting or weakness. Nothing aggravates the symptoms. She has tried nothing for the symptoms. Review of Systems   Constitutional: Positive for activity change and fatigue. Negative for chills and fever. HENT: Positive for congestion, mouth sores, postnasal drip, sore throat and trouble swallowing. Negative for ear pain, sinus pressure and sinus pain. Respiratory: Positive for cough. Negative for shortness of breath. Cardiovascular: Negative for chest pain and leg swelling. Gastrointestinal: Negative for abdominal pain, nausea and vomiting. Musculoskeletal: Negative for neck pain. Skin: Positive for rash (blister on back of the toungue). Neurological: Negative for dizziness, weakness, numbness and headaches. Psychiatric/Behavioral: Negative for agitation. The patient is not nervous/anxious. Prior to Visit Medications    Medication Sig Taking?  Authorizing Provider   amoxicillin (AMOXIL) 500 MG capsule Take 1 capsule by mouth 3 times daily for 10 days Yes Roby Piña MD   Insulin Syringe-Needle U-100 (INSULIN SYRINGE .5CC/31G\") 31G X \" 0.5 ML MISC 1 each by Does not apply route daily Yes Roby Piña MD   SYRINGE-NEEDLE, DISP, 3 ML (Jenny Jenkins SYRINGE/NEEDLE) 23G X 1\" 3 ML MISC Use once a wk Yes Raman Hood MD   cyanocobalamin 1000 MCG/ML injection Inject 1 mL into the muscle every 30 days Yes Raman Hood MD   ergocalciferol (DRISDOL) 1.25 MG (59382 UT) capsule Take 1 capsule by mouth once a week Yes Raman Hood MD   loratadine (CLARITIN) 10 MG tablet Take 1 tablet by mouth daily Yes SHEREE Hollis CNP   Clobetasol Propionate 0.05 % SHAM Apply 1 Dose topically daily as needed (psoriasis) Yes SHEREE Hollis CNP   albuterol sulfate  (90 Base) MCG/ACT inhaler INHALE 2 PUFFS INTO THE LUNGS 4 TIMES DAILY AS NEEDED FOR WHEEZING Yes SHEREE Hollis CNP   azelastine (ASTELIN) 0.1 % nasal spray 1 spray by Nasal route 2 times daily Use in each nostril as directed Yes SHEREE Hollis CNP   fluticasone (FLONASE) 50 MCG/ACT nasal spray 2 sprays by Each Nostril route daily Yes Agus Neves,    Naproxen Sodium (ALEVE) 220 MG CAPS Take 1 tablet by mouth as needed for Pain. Yes Historical Provider, MD   Multiple Vitamin (MULTIVITAMIN PO) Take 1 tablet by mouth daily. Yes Historical Provider, MD   montelukast (SINGULAIR) 10 MG tablet TAKE 1 TABLET BY MOUTH EVERY DAY  Patient not taking: Reported on 1/26/2022  Raman Hood MD       Social History     Tobacco Use    Smoking status: Current Every Day Smoker     Packs/day: 1.50     Years: 25.00     Pack years: 45.53     Start date: 12/19/1980    Smokeless tobacco: Never Used   Vaping Use    Vaping Use: Never used   Substance Use Topics    Alcohol use: Yes     Comment: occassionally    Drug use: No        Allergies   Allergen Reactions    Codeine      Stomach cramps    Tramadol     Zofran [Ondansetron Hcl] Other (See Comments)     Severe abd pain   ,   Past Medical History:   Diagnosis Date    [facility] abscess [facility]/01/2015    [facility]    History of cardiac monitoring 07/01/2021    Lowest HR=63. Max=124. Avg.=96/ 1.1 second episode of SVT. Artifact noted.     Pneumonia 01/01/1988    Psoriasis     Spondylolysis 09/10/2012    Bilateral L5 pars defects, CT done 9-10-12 [facility]   ,   Past Surgical History:   Procedure Laterality Date    CHOLECYSTECTOMY  1988    gall stones    [address] HYSTERECTOMY  2004    benign tumor right ovary    HYSTERECTOMY, VAGINAL      TUBAL LIGATION  1993   ,   Social History     Tobacco Use    Smoking status: Current Every Day Smoker     Packs/day: 1.50     Years: 25.00     Pack years: 37.50     Start date: 12/19/1980    Smokeless tobacco: Never Used   Vaping Use    Vaping Use: Never used   Substance Use Topics    Alcohol use: Yes     Comment: occassionally    Drug use: No   ,   Family History   Problem Relation Age of Onset    Cancer Mother     Diabetes Mother     High Blood Pressure Mother     Obesity Mother     High Cholesterol Mother     Allergies Father     Diabetes Son     Depression Son     Stroke Maternal Grandfather        PHYSICAL EXAMINATION:  [ INSTRUCTIONS:  \"[x]\" Indicates a positive item  \"[]\" Indicates a negative item  -- DELETE ALL ITEMS NOT EXAMINED]  Vital Signs: (As obtained by patient/caregiver or practitioner observation)    Blood pressure-  Heart rate-    Respiratory rate-    Temperature-  Pulse oximetry-     Constitutional: [x] Appears well-developed and well-nourished [x] No apparent distress      [] Abnormal-   Mental status  [x] Alert and awake  [x] Oriented to person/place/time []Able to follow commands      Eyes:  EOM    [x]  Normal  [] Abnormal-  Sclera  []  Normal  [] Abnormal -         Discharge []  None visible  [] Abnormal -    HENT:   [x] Normocephalic, atraumatic.   [] Abnormal   [] Mouth/Throat: Mucous membranes are moist.     External Ears [] Normal  [] Abnormal-     Neck: [] No visualized mass     Pulmonary/Chest: [] Respiratory effort normal.  [] No visualized signs of difficulty breathing or respiratory distress        [] Abnormal- Musculoskeletal:   [] Normal gait with no signs of ataxia         [] Normal range of motion of neck        [] Abnormal-       Neurological:        [x] No Facial Asymmetry (Cranial nerve 7 motor function) (limited exam to video visit)          [] No gaze palsy        [] Abnormal-         Skin:        [] No significant exanthematous lesions or discoloration noted on facial skin         [] Abnormal-            Psychiatric:       [x] Normal Affect [] No Hallucinations        [] Abnormal-     Other pertinent observable physical exam findings-     ASSESSMENT/PLAN:  1. Acute pharyngitis, unspecified etiology  - try:  - amoxicillin (AMOXIL) 500 MG capsule; Take 1 capsule by mouth 3 times daily for 10 days  Dispense: 30 capsule; Refill: 0  - lots of fluids      Return if symptoms worsen or fail to improve. Santino Mark, was evaluated through a synchronous (real-time) audio-video encounter. The patient (or guardian if applicable) is aware that this is a billable service, which includes applicable co-pays. This Virtual Visit was conducted with patient's (and/or legal guardian's) consent. The visit was conducted pursuant to the emergency declaration under the [address] [place] [address], [address] [place] [address] authority and the [employer] General Act. Patient identification was verified, and a caregiver was present when appropriate. The patient was located at home in a state where the provider was licensed to provide care. Total time spent on this encounter: 20 minutes    --Olu Bosch MD on 1/30/2022 at 6:02 PM    An electronic signature was used to authenticate this note.

## 2022-01-30 ASSESSMENT — ENCOUNTER SYMPTOMS
NAUSEA: 0
SHORTNESS OF BREATH: 0
VOMITING: 0
ABDOMINAL PAIN: 0
TROUBLE SWALLOWING: 1
SINUS PAIN: 0
COUGH: 1
SWOLLEN GLANDS: 0
SINUS PRESSURE: 0
SORE THROAT: 1

## 2022-02-23 DIAGNOSIS — R09.81 NASAL CONGESTION: ICD-10-CM

## 2022-02-23 RX ORDER — LORATADINE 10 MG/1
TABLET ORAL
Qty: 30 TABLET | Refills: 5 | Status: SHIPPED | OUTPATIENT
Start: 2022-02-23

## 2023-03-12 ENCOUNTER — APPOINTMENT (OUTPATIENT)
Dept: CT IMAGING | Age: 58
End: 2023-03-12
Payer: COMMERCIAL

## 2023-03-12 ENCOUNTER — HOSPITAL ENCOUNTER (EMERGENCY)
Age: 58
Discharge: HOME | End: 2023-03-12
Attending: EMERGENCY MEDICINE
Payer: COMMERCIAL

## 2023-03-12 VITALS
TEMPERATURE: 98 F | HEART RATE: 78 BPM | SYSTOLIC BLOOD PRESSURE: 135 MMHG | OXYGEN SATURATION: 99 % | DIASTOLIC BLOOD PRESSURE: 88 MMHG | RESPIRATION RATE: 18 BRPM

## 2023-03-12 DIAGNOSIS — N20.0 KIDNEY STONE: ICD-10-CM

## 2023-03-12 DIAGNOSIS — N23 RENAL COLIC: ICD-10-CM

## 2023-03-12 DIAGNOSIS — N20.1 URETEROLITHIASIS: Primary | ICD-10-CM

## 2023-03-12 LAB
ALBUMIN SERPL-MCNC: 4.3 GM/DL (ref 3.4–5)
ALP BLD-CCNC: 101 IU/L (ref 40–129)
ALT SERPL-CCNC: 14 U/L (ref 10–40)
ANION GAP SERPL CALCULATED.3IONS-SCNC: 11 MMOL/L (ref 4–16)
AST SERPL-CCNC: 18 IU/L (ref 15–37)
BASOPHILS ABSOLUTE: 0.1 K/CU MM
BASOPHILS RELATIVE PERCENT: 0.6 % (ref 0–1)
BILIRUB SERPL-MCNC: 0.3 MG/DL (ref 0–1)
BUN SERPL-MCNC: 19 MG/DL (ref 6–23)
CALCIUM SERPL-MCNC: 10.1 MG/DL (ref 8.3–10.6)
CHLORIDE BLD-SCNC: 106 MMOL/L (ref 99–110)
CO2: 25 MMOL/L (ref 21–32)
CREAT SERPL-MCNC: 0.8 MG/DL (ref 0.6–1.1)
DIFFERENTIAL TYPE: ABNORMAL
EOSINOPHILS ABSOLUTE: 0.1 K/CU MM
EOSINOPHILS RELATIVE PERCENT: 1.6 % (ref 0–3)
GFR SERPL CREATININE-BSD FRML MDRD: >60 ML/MIN/1.73M2
GLUCOSE SERPL-MCNC: 118 MG/DL (ref 70–99)
HCT VFR BLD CALC: 44.4 % (ref 37–47)
HEMOGLOBIN: 14.3 GM/DL (ref 12.5–16)
IMMATURE NEUTROPHIL %: 0.3 % (ref 0–0.43)
LYMPHOCYTES ABSOLUTE: 1.9 K/CU MM
LYMPHOCYTES RELATIVE PERCENT: 24.1 % (ref 24–44)
MCH RBC QN AUTO: 28.3 PG (ref 27–31)
MCHC RBC AUTO-ENTMCNC: 32.2 % (ref 32–36)
MCV RBC AUTO: 87.9 FL (ref 78–100)
MONOCYTES ABSOLUTE: 0.7 K/CU MM
MONOCYTES RELATIVE PERCENT: 8.9 % (ref 0–4)
PDW BLD-RTO: 13.8 % (ref 11.7–14.9)
PLATELET # BLD: 318 K/CU MM (ref 140–440)
PMV BLD AUTO: 10.3 FL (ref 7.5–11.1)
POTASSIUM SERPL-SCNC: 4 MMOL/L (ref 3.5–5.1)
RBC # BLD: 5.05 M/CU MM (ref 4.2–5.4)
SEGMENTED NEUTROPHILS ABSOLUTE COUNT: 5.1 K/CU MM
SEGMENTED NEUTROPHILS RELATIVE PERCENT: 64.5 % (ref 36–66)
SODIUM BLD-SCNC: 142 MMOL/L (ref 135–145)
TOTAL IMMATURE NEUTOROPHIL: 0.02 K/CU MM
TOTAL PROTEIN: 7.3 GM/DL (ref 6.4–8.2)
WBC # BLD: 7.9 K/CU MM (ref 4–10.5)

## 2023-03-12 PROCEDURE — 80053 COMPREHEN METABOLIC PANEL: CPT

## 2023-03-12 PROCEDURE — 96375 TX/PRO/DX INJ NEW DRUG ADDON: CPT

## 2023-03-12 PROCEDURE — 99284 EMERGENCY DEPT VISIT MOD MDM: CPT

## 2023-03-12 PROCEDURE — 74176 CT ABD & PELVIS W/O CONTRAST: CPT

## 2023-03-12 PROCEDURE — 6360000002 HC RX W HCPCS: Performed by: EMERGENCY MEDICINE

## 2023-03-12 PROCEDURE — 2580000003 HC RX 258: Performed by: EMERGENCY MEDICINE

## 2023-03-12 PROCEDURE — 85025 COMPLETE CBC W/AUTO DIFF WBC: CPT

## 2023-03-12 PROCEDURE — 96374 THER/PROPH/DIAG INJ IV PUSH: CPT

## 2023-03-12 RX ORDER — OXYCODONE HYDROCHLORIDE AND ACETAMINOPHEN 5; 325 MG/1; MG/1
1 TABLET ORAL EVERY 6 HOURS PRN
Qty: 10 TABLET | Refills: 0 | Status: SHIPPED | OUTPATIENT
Start: 2023-03-12 | End: 2023-03-15

## 2023-03-12 RX ORDER — MORPHINE SULFATE 4 MG/ML
4 INJECTION, SOLUTION INTRAMUSCULAR; INTRAVENOUS EVERY 30 MIN PRN
Status: DISCONTINUED | OUTPATIENT
Start: 2023-03-12 | End: 2023-03-12 | Stop reason: HOSPADM

## 2023-03-12 RX ORDER — PROMETHAZINE HYDROCHLORIDE 25 MG/1
25 SUPPOSITORY RECTAL EVERY 6 HOURS PRN
Qty: 10 SUPPOSITORY | Refills: 2 | Status: SHIPPED | OUTPATIENT
Start: 2023-03-12 | End: 2023-03-13 | Stop reason: SDUPTHER

## 2023-03-12 RX ORDER — NAPROXEN 500 MG/1
500 TABLET ORAL 2 TIMES DAILY
Qty: 20 TABLET | Refills: 0 | Status: SHIPPED | OUTPATIENT
Start: 2023-03-12 | End: 2023-03-13 | Stop reason: SDUPTHER

## 2023-03-12 RX ORDER — DIPHENHYDRAMINE HYDROCHLORIDE 50 MG/ML
50 INJECTION INTRAMUSCULAR; INTRAVENOUS ONCE
Status: COMPLETED | OUTPATIENT
Start: 2023-03-12 | End: 2023-03-12

## 2023-03-12 RX ORDER — PROCHLORPERAZINE EDISYLATE 5 MG/ML
10 INJECTION INTRAMUSCULAR; INTRAVENOUS ONCE
Status: COMPLETED | OUTPATIENT
Start: 2023-03-12 | End: 2023-03-12

## 2023-03-12 RX ORDER — 0.9 % SODIUM CHLORIDE 0.9 %
1000 INTRAVENOUS SOLUTION INTRAVENOUS ONCE
Status: COMPLETED | OUTPATIENT
Start: 2023-03-12 | End: 2023-03-12

## 2023-03-12 RX ORDER — KETOROLAC TROMETHAMINE 30 MG/ML
30 INJECTION, SOLUTION INTRAMUSCULAR; INTRAVENOUS ONCE
Status: COMPLETED | OUTPATIENT
Start: 2023-03-12 | End: 2023-03-12

## 2023-03-12 RX ADMIN — MORPHINE SULFATE 4 MG: 4 INJECTION, SOLUTION INTRAMUSCULAR; INTRAVENOUS at 10:05

## 2023-03-12 RX ADMIN — KETOROLAC TROMETHAMINE 30 MG: 30 INJECTION, SOLUTION INTRAMUSCULAR; INTRAVENOUS at 10:03

## 2023-03-12 RX ADMIN — DIPHENHYDRAMINE HYDROCHLORIDE 50 MG: 50 INJECTION, SOLUTION INTRAMUSCULAR; INTRAVENOUS at 10:03

## 2023-03-12 RX ADMIN — PROCHLORPERAZINE EDISYLATE 10 MG: 5 INJECTION INTRAMUSCULAR; INTRAVENOUS at 10:04

## 2023-03-12 RX ADMIN — SODIUM CHLORIDE 1000 ML: 9 INJECTION, SOLUTION INTRAVENOUS at 10:06

## 2023-03-12 ASSESSMENT — PAIN SCALES - GENERAL: PAINLEVEL_OUTOF10: 10

## 2023-03-12 NOTE — ED NOTES
Patient returned from radiology. Patient laying on right side with eyes closed, appears to be asleep and comfortable. Patient reconnected to pulse ox and BP monitor. Room lights dimmed.      Ayush Xiong RN  03/12/23 [id number]

## 2023-03-12 NOTE — ED PROVIDER NOTES
Emergency Department Encounter  Location: [facility] EMERGENCY DEPARTMENT    Patient: Ana Mckee  MRN: [id number]  : 1965  Date of evaluation: 3/12/2023  ED Provider: Rikki Trinidad DO, FACEP    Chief Complaint:    Flank Pain (C/o right flank radiating into the RLQ since approx 0730 this morning. Patient believes she is having another kidney stone.)    [place]:  Ana Mckee is a 57 y.o. female that presents to the emergency department complaints of sudden onset of right flank pain radiating into her right lower quadrant that started about 730 this morning.  Patient's had a history of kidney stones and states this is similar to her previous kidney stone pain.  She is complaining of severe pain in her right flank radiating to her right lower quadrant with associated vomiting and dry heaving.  She states her last kidney stone was several years ago.  She states the pain \"hurts so bad\".  The patient is moaning in pain and crying and cannot lay still on the bed.      ROS - see HPI, below listed is current ROS at time of my eval:  At least 10 systems reviewed and otherwise acutely negative except as in the [place].  General:  No fevers, no chills, no weakness  Eyes:  No recent vison changes, no discharge  ENT:  No sore throat, no nasal congestion, no hearing changes  Cardiovascular:  No chest pain, no palpitations  Respiratory:  No shortness of breath, no cough, no wheezing  Gastrointestinal: Positive for right flank and right lower quadrant abdominal pain with nausea and vomiting no diarrhea  Musculoskeletal:  No muscle pain, no joint pain  Skin:  No rash, no pruritis, no easy bruising  Neurologic:  No speech problems, no headache, no extremity numbness, no extremity tingling, no extremity weakness  Psychiatric:  No anxiety  Genitourinary:  No dysuria, no hematuria  Endocrine:  No unexpected weight gain, no unexpected weight loss  Extremities:  no edema, no pain    Past Medical History:   Diagnosis Date     Dental abscess 01/01/2015    [facility] ED    History of cardiac monitoring 07/01/2021    Lowest HR=63. Max=124. Avg.=96/ 1.1 second episode of SVT. Artifact noted.    Pneumonia 01/01/1988    Psoriasis     Spondylolysis 09/10/2012    Bilateral L5 pars defects, CT done 9-10-12 [facility]     Past Surgical History:   Procedure Laterality Date    CHOLECYSTECTOMY  1988    gall stones    ECTOPIC PREGNANCY SURGERY  1996    HYSTERECTOMY  2004    benign tumor right ovary    HYSTERECTOMY, VAGINAL      TUBAL LIGATION  1993     Family History   Problem Relation Age of Onset    Cancer Mother     Diabetes Mother     High Blood Pressure Mother     Obesity Mother     High Cholesterol Mother     Allergies Father     Diabetes Son     Depression Son     Stroke Maternal Grandfather      Social History     Socioeconomic History    Marital status: Single     Spouse name: Not on file    Number of children: Not on file    Years of education: Not on file    Highest education level: Not on file   Occupational History    Not on file   Tobacco Use    Smoking status: Every Day     Packs/day: 1.50     Years: 25.00     Pack years: 37.50     Types: Cigarettes     Start date: 12/19/1980    Smokeless tobacco: Never   Vaping Use    Vaping Use: Never used   Substance and Sexual Activity    Alcohol use: Yes     Comment: occassionally    Drug use: No    Sexual activity: Not Currently   Other Topics Concern    Not on file   Social History Narrative    Not on file     Social Determinants of Health     Financial Resource Strain: Not on file   Food Insecurity: Not on file   Transportation Needs: Not on file   Physical Activity: Not on file   Stress: Not on file   Social Connections: Not on file   Intimate Partner Violence: Not on file   Housing Stability: Not on file     Current Facility-Administered Medications   Medication Dose Route Frequency Provider Last Rate Last Admin    morphine sulfate (PF) injection 4 mg  4 mg  IntraVENous Q30 Min PRN Macario Hill, DO   4 mg at 03/12/23 1005     Current Outpatient Medications   Medication Sig Dispense Refill    oxyCODONE-acetaminophen (PERCOCET) 5-325 MG per tablet Take 1 tablet by mouth every 6 hours as needed for Pain for up to 3 days. Max Daily Amount: 4 tablets 10 tablet 0    naproxen (NAPROSYN) 500 MG tablet Take 1 tablet by mouth 2 times daily 20 tablet 0    promethazine (PROMETHEGAN) 25 MG suppository Place 1 suppository rectally every 6 hours as needed for Nausea 10 suppository 2    loratadine (CLARITIN) 10 MG tablet TAKE 1 TABLET BY MOUTH EVERY DAY 30 tablet 5    Insulin Syringe-Needle U-100 (INSULIN SYRINGE .5CC/31GX5/16\") 31G X 5/16\" 0.5 ML MISC 1 each by Does not apply route daily 5 each 1    montelukast (SINGULAIR) 10 MG tablet TAKE 1 TABLET BY MOUTH EVERY DAY (Patient not taking: Reported on 1/26/2022) 30 tablet 3    SYRINGE-NEEDLE, DISP, 3 ML (SECURESAFE SYRINGE/NEEDLE) 23G X 1\" 3 ML MISC Use once a wk 50 each 0    cyanocobalamin 1000 MCG/ML injection Inject 1 mL into the muscle every 30 days 1 mL 5    ergocalciferol (DRISDOL) 1.25 MG (17602 UT) capsule Take 1 capsule by mouth once a week 4 capsule 5    Clobetasol Propionate 0.05 % SHAM Apply 1 Dose topically daily as needed (psoriasis) 1 Bottle 1    albuterol sulfate  (90 Base) MCG/ACT inhaler INHALE 2 PUFFS INTO THE LUNGS 4 TIMES DAILY AS NEEDED FOR WHEEZING 6.7 Inhaler 0    azelastine (ASTELIN) 0.1 % nasal spray 1 spray by Nasal route 2 times daily Use in each nostril as directed 1 Bottle 0    fluticasone (FLONASE) 50 MCG/ACT nasal spray 2 sprays by Each Nostril route daily 1 Bottle 0    Naproxen Sodium (ALEVE) 220 MG CAPS Take 1 tablet by mouth as needed for Pain. Multiple Vitamin (MULTIVITAMIN PO) Take 1 tablet by mouth daily.        Allergies   Allergen Reactions    Codeine      Stomach cramps    Tramadol     Zofran [Ondansetron Hcl] Other (See Comments)     Severe abd pain       Nursing Notes Reviewed    Physical Exam:  ED Triage Vitals   Enc Vitals Group      BP       Pulse       Resp       Temp       Temp src       SpO2       Weight       Height       Head Circumference       Peak Flow       Pain Score       Pain Loc       Pain Edu? Excl. in [address]? GENERAL APPEARANCE: Awake and alert. Cooperative. Moderate acute distress. She is crying in pain stating \"oh God\" she is saying [place], oh, oh,! \"She is unable to stay still in the bed  HEAD: Normocephalic. Atraumatic. EYES: EOM's grossly intact. Sclera anicteric. ENT: Tolerates saliva. No trismus. NECK: Supple. Trachea midline. CARDIO: RRR. Radial pulse 2+. LUNGS: Respirations unlabored. CTAB. ABDOMEN: Soft. Non-distended. Tenderness in her right lower quadrant and her right flank. EXTREMITIES: No acute deformities. SKIN: Warm and dry. Skin is not pale. She is not diaphoretic  NEUROLOGICAL: No gross facial drooping. Moves all 4 extremities spontaneously. PSYCHIATRIC: Normal mood.      Labs:  Results for orders placed or performed during the hospital encounter of 03/12/23   CBC with Auto Differential   Result Value Ref Range    WBC 7.9 4.0 - 10.5 K/CU MM    RBC 5.05 4.2 - 5.4 M/CU MM    Hemoglobin 14.3 12.5 - 16.0 GM/DL    Hematocrit 44.4 37 - 47 %    MCV 87.9 78 - 100 FL    MCH 28.3 27 - 31 PG    MCHC 32.2 32.0 - 36.0 %    RDW 13.8 11.7 - 14.9 %    Platelets 090 695 - 809 K/CU MM    MPV 10.3 7.5 - 11.1 FL    Differential Type AUTOMATED DIFFERENTIAL     Segs Relative 64.5 36 - 66 %    Lymphocytes % 24.1 24 - 44 %    Monocytes % 8.9 (H) 0 - 4 %    Eosinophils % 1.6 0 - 3 %    Basophils % 0.6 0 - 1 %    Segs Absolute 5.1 K/CU MM    Lymphocytes Absolute 1.9 K/CU MM    Monocytes Absolute 0.7 K/CU MM    Eosinophils Absolute 0.1 K/CU MM    Basophils Absolute 0.1 K/CU MM    Immature Neutrophil % 0.3 0 - 0.43 %    Total Immature Neutrophil 0.02 K/CU MM   Comprehensive Metabolic Panel   Result Value Ref Range    Sodium 142 135 - 145 MMOL/L Potassium 4.0 3.5 - 5.1 MMOL/L    Chloride 106 99 - 110 mMol/L    CO2 25 21 - 32 MMOL/L    BUN 19 6 - 23 MG/DL    Creatinine 0.8 0.6 - 1.1 MG/DL    Est, Glom Filt Rate >60 >60 mL/min/1.73m2    Glucose 118 (H) 70 - 99 MG/DL    Calcium 10.1 8.3 - 10.6 MG/DL    Albumin 4.3 3.4 - 5.0 GM/DL    Total Protein 7.3 6.4 - 8.2 GM/DL    Total Bilirubin 0.3 0.0 - 1.0 MG/DL    ALT 14 10 - 40 U/L    AST 18 15 - 37 IU/L    Alkaline Phosphatase 101 40 - 129 IU/L    Anion Gap 11 4 - 16           Radiographs (if obtained):  [] The following radiograph was interpreted by myself in the absence of a radiologist:  [x] Radiologist's Report reviewed at time of ED visit:  CT ABDOMEN PELVIS WO CONTRAST   Preliminary Result   1. Mild-to-moderate right-sided hydronephrosis, secondary to a 5 mm proximal   right ureteral stone   2. Moderate colonic stool burden which could indicate constipation. ED Course and MDM:  Patient has a 5 mm proximal stone in the right ureter with hydronephrosis that is mild to moderate in severity. She has a moderate colonic stool burden as well. CC/HPI Summary, DDx, ED Course, and Reassessment: Here in the ER the patient was given morphine Benadryl Compazine and Toradol and she is resting comfortably and think she will do well at home with oral medications.   She will be discharged and is to follow-up with urologist tomorrow    History from : Patient    Limitations to history : None    Patient was given the following medications:  Medications   morphine sulfate (PF) injection 4 mg (4 mg IntraVENous Given 3/12/23 1005)   ketorolac (TORADOL) injection 30 mg (30 mg IntraVENous Given 3/12/23 1003)   prochlorperazine (COMPAZINE) injection 10 mg (10 mg IntraVENous Given 3/12/23 1004)   diphenhydrAMINE (BENADRYL) injection 50 mg (50 mg IntraVENous Given 3/12/23 1003)   0.9 % sodium chloride bolus (1,000 mLs IntraVENous New Bag 3/12/23 1006)       Independent Imaging Interpretation by me: None    EKG (if obtained): (All EKG's are interpreted by myself in the absence of a cardiologist) not ordered    Chronic conditions affecting care: None    Discussion with Other Profesionals : None    Social Determinants : None    Records Reviewed : None    Disposition Considerations (tests considered but not done, Shared Decision Making, Pt Expectation of Test or Tx.): Patient will be discharged in stable condition follow-up with urology tomorrow. She is to return if her condition worsens. She will be discharged stable condition with a kidney stone. Appropriate for outpatient management      I am the Primary Clinician of Record. Final Impression:  1. Ureterolithiasis    2. Kidney stone    3. Renal colic      DISPOSITION Decision To Discharge    Patient referred to: Javon Clark, [id number] [address] [place] [address] [phone]    Schedule an appointment as soon as possible for a visit in 1 day  For follow up    [facility] Emergency Department  [facility] [address] [facility]  [phone]  Go to   If symptoms worsen  Discharge medications:  New Prescriptions    NAPROXEN (NAPROSYN) 500 MG TABLET    Take 1 tablet by mouth 2 times daily    OXYCODONE-ACETAMINOPHEN (PERCOCET) 5-325 MG PER TABLET    Take 1 tablet by mouth every 6 hours as needed for Pain for up to 3 days.  Max Daily Amount: 4 tablets    PROMETHAZINE (PROMETHEGAN) 25 MG SUPPOSITORY    Place 1 suppository rectally every 6 hours as needed for Nausea     (Please note that portions of this note may have been completed with a voice recognition program. Efforts were made to edit the dictations but occasionally words are mis-transcribed.)    Juanita Hi DO, [address] [facility]  Board certified in Pending sale to [facility], [address] [place] [address]  03/12/23 [facility]

## 2023-03-12 NOTE — DISCHARGE INSTRUCTIONS
Use medication as needed. Follow-up with urologist tomorrow. Return to the ER for any worsening pain or worsening symptoms.

## 2023-03-12 NOTE — ED NOTES
Discharge instructions reviewed and pt acknowledges understanding. Ambulatory at discharge.        Juarez Merino RN  03/12/23 1210

## 2023-03-12 NOTE — ED NOTES
Unable to complete triage at this time. Patient extremely restless, tearful and hysterically in pain. Dr. Fabienne Glez at bedside.      Lyndsay Cazares RN  03/12/23 9193

## 2023-03-12 NOTE — ED NOTES
Patient to radiology via bed escorted by [facility] radiology tech.      Connor Burden, JERALD  03/12/23 1016

## 2023-03-13 RX ORDER — PROMETHAZINE HYDROCHLORIDE 25 MG/1
25 SUPPOSITORY RECTAL EVERY 6 HOURS PRN
Qty: 10 SUPPOSITORY | Refills: 2 | Status: ON HOLD | OUTPATIENT
Start: 2023-03-13 | End: 2023-03-18 | Stop reason: HOSPADM

## 2023-03-13 RX ORDER — NAPROXEN 500 MG/1
500 TABLET ORAL 2 TIMES DAILY
Qty: 20 TABLET | Refills: 0 | Status: ON HOLD | OUTPATIENT
Start: 2023-03-13 | End: 2023-03-18 | Stop reason: HOSPADM

## 2023-03-16 ENCOUNTER — APPOINTMENT (OUTPATIENT)
Dept: CT IMAGING | Age: 58
DRG: 465 | End: 2023-03-16
Payer: COMMERCIAL

## 2023-03-16 ENCOUNTER — HOSPITAL ENCOUNTER (INPATIENT)
Age: 58
LOS: 2 days | Discharge: HOME | DRG: 465 | End: 2023-03-18
Attending: EMERGENCY MEDICINE
Payer: COMMERCIAL

## 2023-03-16 DIAGNOSIS — N17.9 ACUTE KIDNEY INJURY (HCC): ICD-10-CM

## 2023-03-16 DIAGNOSIS — N13.2 HYDRONEPHROSIS WITH URINARY OBSTRUCTION DUE TO URETERAL CALCULUS: Primary | ICD-10-CM

## 2023-03-16 PROBLEM — N20.0 NEPHROLITHIASIS: Status: ACTIVE | Noted: 2023-03-16

## 2023-03-16 LAB
ALBUMIN SERPL-MCNC: 4.2 GM/DL (ref 3.4–5)
ALP BLD-CCNC: 130 IU/L (ref 40–129)
ALT SERPL-CCNC: 87 U/L (ref 10–40)
ANION GAP SERPL CALCULATED.3IONS-SCNC: 11 MMOL/L (ref 4–16)
AST SERPL-CCNC: 76 IU/L (ref 15–37)
BACTERIA: NEGATIVE /HPF
BASOPHILS ABSOLUTE: 0.1 K/CU MM
BASOPHILS RELATIVE PERCENT: 0.4 % (ref 0–1)
BILIRUB SERPL-MCNC: 0.6 MG/DL (ref 0–1)
BILIRUBIN URINE: ABNORMAL MG/DL
BLOOD, URINE: ABNORMAL
BUN SERPL-MCNC: 20 MG/DL (ref 6–23)
CALCIUM OXALATE CRYSTALS: ABNORMAL /HPF
CALCIUM SERPL-MCNC: 10.1 MG/DL (ref 8.3–10.6)
CHLORIDE BLD-SCNC: 101 MMOL/L (ref 99–110)
CLARITY: CLEAR
CO2: 23 MMOL/L (ref 21–32)
COLOR: YELLOW
CREAT SERPL-MCNC: 1.4 MG/DL (ref 0.6–1.1)
DIFFERENTIAL TYPE: ABNORMAL
EOSINOPHILS ABSOLUTE: 0.3 K/CU MM
EOSINOPHILS RELATIVE PERCENT: 2.4 % (ref 0–3)
GFR SERPL CREATININE-BSD FRML MDRD: 44 ML/MIN/1.73M2
GLUCOSE SERPL-MCNC: 110 MG/DL (ref 70–99)
GLUCOSE, URINE: NEGATIVE MG/DL
HCT VFR BLD CALC: 47.1 % (ref 37–47)
HEMOGLOBIN: 15.6 GM/DL (ref 12.5–16)
IMMATURE NEUTROPHIL %: 0.2 % (ref 0–0.43)
KETONES, URINE: 40 MG/DL
LEUKOCYTE ESTERASE, URINE: NEGATIVE
LIPASE: 16 IU/L (ref 13–60)
LYMPHOCYTES ABSOLUTE: 1.2 K/CU MM
LYMPHOCYTES RELATIVE PERCENT: 9.2 % (ref 24–44)
MCH RBC QN AUTO: 28.8 PG (ref 27–31)
MCHC RBC AUTO-ENTMCNC: 33.1 % (ref 32–36)
MCV RBC AUTO: 86.9 FL (ref 78–100)
MONOCYTES ABSOLUTE: 1 K/CU MM
MONOCYTES RELATIVE PERCENT: 7.5 % (ref 0–4)
MUCUS: ABNORMAL HPF
NITRITE URINE, QUANTITATIVE: NEGATIVE
NUCLEATED RBC %: 0 %
PDW BLD-RTO: 13.7 % (ref 11.7–14.9)
PH, URINE: 5.5 (ref 5–8)
PLATELET # BLD: 319 K/CU MM (ref 140–440)
PMV BLD AUTO: 10.6 FL (ref 7.5–11.1)
POTASSIUM SERPL-SCNC: 4.4 MMOL/L (ref 3.5–5.1)
PROTEIN UA: 30 MG/DL
RBC # BLD: 5.42 M/CU MM (ref 4.2–5.4)
RBC URINE: 340 /HPF (ref 0–6)
SEGMENTED NEUTROPHILS ABSOLUTE COUNT: 10.6 K/CU MM
SEGMENTED NEUTROPHILS RELATIVE PERCENT: 80.3 % (ref 36–66)
SODIUM BLD-SCNC: 135 MMOL/L (ref 135–145)
SPECIFIC GRAVITY UA: >1.03 (ref 1–1.03)
SQUAMOUS EPITHELIAL: 5 /HPF
TOTAL IMMATURE NEUTOROPHIL: 0.03 K/CU MM
TOTAL NUCLEATED RBC: 0 K/CU MM
TOTAL PROTEIN: 7.2 GM/DL (ref 6.4–8.2)
TRICHOMONAS: ABNORMAL /HPF
UROBILINOGEN, URINE: 0.2 MG/DL (ref 0.2–1)
WBC # BLD: 13.2 K/CU MM (ref 4–10.5)
WBC UA: ABNORMAL /HPF (ref 0–5)

## 2023-03-16 PROCEDURE — 80053 COMPREHEN METABOLIC PANEL: CPT

## 2023-03-16 PROCEDURE — 83690 ASSAY OF LIPASE: CPT

## 2023-03-16 PROCEDURE — 99285 EMERGENCY DEPT VISIT HI MDM: CPT

## 2023-03-16 PROCEDURE — 85025 COMPLETE CBC W/AUTO DIFF WBC: CPT

## 2023-03-16 PROCEDURE — 81001 URINALYSIS AUTO W/SCOPE: CPT

## 2023-03-16 PROCEDURE — 6360000002 HC RX W HCPCS: Performed by: EMERGENCY MEDICINE

## 2023-03-16 PROCEDURE — 2580000003 HC RX 258: Performed by: EMERGENCY MEDICINE

## 2023-03-16 PROCEDURE — 96375 TX/PRO/DX INJ NEW DRUG ADDON: CPT

## 2023-03-16 PROCEDURE — 1200000000 HC SEMI PRIVATE

## 2023-03-16 PROCEDURE — 74176 CT ABD & PELVIS W/O CONTRAST: CPT

## 2023-03-16 PROCEDURE — 96374 THER/PROPH/DIAG INJ IV PUSH: CPT

## 2023-03-16 PROCEDURE — 96372 THER/PROPH/DIAG INJ SC/IM: CPT

## 2023-03-16 RX ORDER — FENTANYL CITRATE 50 UG/ML
50 INJECTION, SOLUTION INTRAMUSCULAR; INTRAVENOUS ONCE
Status: COMPLETED | OUTPATIENT
Start: 2023-03-16 | End: 2023-03-16

## 2023-03-16 RX ORDER — 0.9 % SODIUM CHLORIDE 0.9 %
1000 INTRAVENOUS SOLUTION INTRAVENOUS ONCE
Status: COMPLETED | OUTPATIENT
Start: 2023-03-16 | End: 2023-03-16

## 2023-03-16 RX ORDER — KETOROLAC TROMETHAMINE 30 MG/ML
30 INJECTION, SOLUTION INTRAMUSCULAR; INTRAVENOUS ONCE
Status: COMPLETED | OUTPATIENT
Start: 2023-03-16 | End: 2023-03-16

## 2023-03-16 RX ORDER — PROMETHAZINE HYDROCHLORIDE 25 MG/ML
25 INJECTION, SOLUTION INTRAMUSCULAR; INTRAVENOUS ONCE
Status: COMPLETED | OUTPATIENT
Start: 2023-03-16 | End: 2023-03-16

## 2023-03-16 RX ADMIN — CEFTRIAXONE SODIUM 1000 MG: 1 INJECTION, POWDER, FOR SOLUTION INTRAMUSCULAR; INTRAVENOUS at 21:45

## 2023-03-16 RX ADMIN — PROMETHAZINE HYDROCHLORIDE 25 MG: 25 INJECTION INTRAMUSCULAR; INTRAVENOUS at 20:36

## 2023-03-16 RX ADMIN — KETOROLAC TROMETHAMINE 30 MG: 30 INJECTION, SOLUTION INTRAMUSCULAR; INTRAVENOUS at 20:36

## 2023-03-16 RX ADMIN — SODIUM CHLORIDE 1000 ML: 9 INJECTION, SOLUTION INTRAVENOUS at 19:26

## 2023-03-16 RX ADMIN — FENTANYL CITRATE 50 MCG: 50 INJECTION INTRAMUSCULAR; INTRAVENOUS at 19:28

## 2023-03-16 ASSESSMENT — PAIN SCALES - GENERAL
PAINLEVEL_OUTOF10: 10
PAINLEVEL_OUTOF10: 10

## 2023-03-16 NOTE — ED TRIAGE NOTES
Patient to triage with c/o lower abd. Pain that started on Sunday. Patient states she thinks she has kidney stones. Patient was supposed to follow up with urology today but did not attend that appointment. Resps even and unlabored.

## 2023-03-17 ENCOUNTER — ANESTHESIA (OUTPATIENT)
Dept: OPERATING ROOM | Age: 58
End: 2023-03-17
Payer: COMMERCIAL

## 2023-03-17 ENCOUNTER — ANESTHESIA EVENT (OUTPATIENT)
Dept: OPERATING ROOM | Age: 58
End: 2023-03-17
Payer: COMMERCIAL

## 2023-03-17 ENCOUNTER — APPOINTMENT (OUTPATIENT)
Dept: GENERAL RADIOLOGY | Age: 58
DRG: 465 | End: 2023-03-17
Payer: COMMERCIAL

## 2023-03-17 LAB
ALBUMIN SERPL-MCNC: 3.7 GM/DL (ref 3.4–5)
ALP BLD-CCNC: 107 IU/L (ref 40–128)
ALT SERPL-CCNC: 58 U/L (ref 10–40)
ANION GAP SERPL CALCULATED.3IONS-SCNC: 8 MMOL/L (ref 4–16)
AST SERPL-CCNC: 36 IU/L (ref 15–37)
BASOPHILS ABSOLUTE: 0.1 K/CU MM
BASOPHILS RELATIVE PERCENT: 0.6 % (ref 0–1)
BILIRUB SERPL-MCNC: 0.3 MG/DL (ref 0–1)
BUN SERPL-MCNC: 19 MG/DL (ref 6–23)
CALCIUM SERPL-MCNC: 8.9 MG/DL (ref 8.3–10.6)
CHLORIDE BLD-SCNC: 105 MMOL/L (ref 99–110)
CHLORIDE URINE RANDOM: 127 MMOL/L (ref 43–210)
CO2: 25 MMOL/L (ref 21–32)
CREAT SERPL-MCNC: 0.8 MG/DL (ref 0.6–1.1)
CREAT UR-MCNC: 164.6 MG/DL (ref 28–217)
DIFFERENTIAL TYPE: ABNORMAL
EOSINOPHILS ABSOLUTE: 0.4 K/CU MM
EOSINOPHILS RELATIVE PERCENT: 4.5 % (ref 0–3)
GAMMA GLUTAMYL TRANSFERASE: 60 IU/L (ref 5–36)
GFR SERPL CREATININE-BSD FRML MDRD: >60 ML/MIN/1.73M2
GLUCOSE SERPL-MCNC: 81 MG/DL (ref 70–99)
HAV IGM SERPL QL IA: NON REACTIVE
HBV CORE IGM SERPL QL IA: NON REACTIVE
HBV SURFACE AG SERPL QL IA: NON REACTIVE
HCT VFR BLD CALC: 41.4 % (ref 37–47)
HCV AB SERPL QL IA: NON REACTIVE
HEMOGLOBIN: 13.3 GM/DL (ref 12.5–16)
IMMATURE NEUTROPHIL %: 0.2 % (ref 0–0.43)
LYMPHOCYTES ABSOLUTE: 1.9 K/CU MM
LYMPHOCYTES RELATIVE PERCENT: 21.4 % (ref 24–44)
MCH RBC QN AUTO: 28.7 PG (ref 27–31)
MCHC RBC AUTO-ENTMCNC: 32.1 % (ref 32–36)
MCV RBC AUTO: 89.2 FL (ref 78–100)
MONOCYTES ABSOLUTE: 0.7 K/CU MM
MONOCYTES RELATIVE PERCENT: 8.2 % (ref 0–4)
NUCLEATED RBC %: 0 %
PDW BLD-RTO: 13.9 % (ref 11.7–14.9)
PLATELET # BLD: 251 K/CU MM (ref 140–440)
PMV BLD AUTO: 10.7 FL (ref 7.5–11.1)
POTASSIUM SERPL-SCNC: 4.5 MMOL/L (ref 3.5–5.1)
POTASSIUM, UR: 60.9 MMOL/L (ref 22–119)
RBC # BLD: 4.64 M/CU MM (ref 4.2–5.4)
SEGMENTED NEUTROPHILS ABSOLUTE COUNT: 5.9 K/CU MM
SEGMENTED NEUTROPHILS RELATIVE PERCENT: 65.1 % (ref 36–66)
SODIUM BLD-SCNC: 138 MMOL/L (ref 135–145)
SODIUM URINE: 118 MMOL/L (ref 35–167)
TOTAL IMMATURE NEUTOROPHIL: 0.02 K/CU MM
TOTAL NUCLEATED RBC: 0 K/CU MM
TOTAL PROTEIN: 5.5 GM/DL (ref 6.4–8.2)
WBC # BLD: 9.1 K/CU MM (ref 4–10.5)

## 2023-03-17 PROCEDURE — 3600000013 HC SURGERY LEVEL 3 ADDTL 15MIN: Performed by: SPECIALIST

## 2023-03-17 PROCEDURE — 36415 COLL VENOUS BLD VENIPUNCTURE: CPT

## 2023-03-17 PROCEDURE — 84300 ASSAY OF URINE SODIUM: CPT

## 2023-03-17 PROCEDURE — 2580000003 HC RX 258: Performed by: STUDENT IN AN ORGANIZED HEALTH CARE EDUCATION/TRAINING PROGRAM

## 2023-03-17 PROCEDURE — 82436 ASSAY OF URINE CHLORIDE: CPT

## 2023-03-17 PROCEDURE — 2580000003 HC RX 258: Performed by: SPECIALIST

## 2023-03-17 PROCEDURE — 7100000001 HC PACU RECOVERY - ADDTL 15 MIN: Performed by: SPECIALIST

## 2023-03-17 PROCEDURE — 84156 ASSAY OF PROTEIN URINE: CPT

## 2023-03-17 PROCEDURE — 82570 ASSAY OF URINE CREATININE: CPT

## 2023-03-17 PROCEDURE — 84133 ASSAY OF URINE POTASSIUM: CPT

## 2023-03-17 PROCEDURE — 82977 ASSAY OF GGT: CPT

## 2023-03-17 PROCEDURE — 6360000002 HC RX W HCPCS: Performed by: SPECIALIST

## 2023-03-17 PROCEDURE — C1769 GUIDE WIRE: HCPCS | Performed by: SPECIALIST

## 2023-03-17 PROCEDURE — 3600000003 HC SURGERY LEVEL 3 BASE: Performed by: SPECIALIST

## 2023-03-17 PROCEDURE — 76000 FLUOROSCOPY <1 HR PHYS/QHP: CPT

## 2023-03-17 PROCEDURE — 1200000000 HC SEMI PRIVATE

## 2023-03-17 PROCEDURE — 3700000001 HC ADD 15 MINUTES (ANESTHESIA): Performed by: SPECIALIST

## 2023-03-17 PROCEDURE — 2500000003 HC RX 250 WO HCPCS: Performed by: NURSE ANESTHETIST, CERTIFIED REGISTERED

## 2023-03-17 PROCEDURE — 80074 ACUTE HEPATITIS PANEL: CPT

## 2023-03-17 PROCEDURE — 0TJB8ZZ INSPECTION OF BLADDER, VIA NATURAL OR ARTIFICIAL OPENING ENDOSCOPIC: ICD-10-PCS | Performed by: SPECIALIST

## 2023-03-17 PROCEDURE — 6360000002 HC RX W HCPCS: Performed by: STUDENT IN AN ORGANIZED HEALTH CARE EDUCATION/TRAINING PROGRAM

## 2023-03-17 PROCEDURE — 6370000000 HC RX 637 (ALT 250 FOR IP): Performed by: STUDENT IN AN ORGANIZED HEALTH CARE EDUCATION/TRAINING PROGRAM

## 2023-03-17 PROCEDURE — 6360000002 HC RX W HCPCS: Performed by: NURSE ANESTHETIST, CERTIFIED REGISTERED

## 2023-03-17 PROCEDURE — BT1D1ZZ FLUOROSCOPY OF RIGHT KIDNEY, URETER AND BLADDER USING LOW OSMOLAR CONTRAST: ICD-10-PCS | Performed by: SPECIALIST

## 2023-03-17 PROCEDURE — 2709999900 HC NON-CHARGEABLE SUPPLY: Performed by: SPECIALIST

## 2023-03-17 PROCEDURE — 7100000000 HC PACU RECOVERY - FIRST 15 MIN: Performed by: SPECIALIST

## 2023-03-17 PROCEDURE — 6360000004 HC RX CONTRAST MEDICATION: Performed by: SPECIALIST

## 2023-03-17 PROCEDURE — 51798 US URINE CAPACITY MEASURE: CPT

## 2023-03-17 PROCEDURE — C1758 CATHETER, URETERAL: HCPCS | Performed by: SPECIALIST

## 2023-03-17 PROCEDURE — 80053 COMPREHEN METABOLIC PANEL: CPT

## 2023-03-17 PROCEDURE — 85025 COMPLETE CBC W/AUTO DIFF WBC: CPT

## 2023-03-17 PROCEDURE — 3700000000 HC ANESTHESIA ATTENDED CARE: Performed by: SPECIALIST

## 2023-03-17 RX ORDER — ENOXAPARIN SODIUM 100 MG/ML
40 INJECTION SUBCUTANEOUS DAILY
Status: DISCONTINUED | OUTPATIENT
Start: 2023-03-17 | End: 2023-03-18 | Stop reason: HOSPADM

## 2023-03-17 RX ORDER — CEFAZOLIN SODIUM 2 G/100ML
2000 INJECTION, SOLUTION INTRAVENOUS
Status: DISCONTINUED | OUTPATIENT
Start: 2023-03-17 | End: 2023-03-17 | Stop reason: CLARIF

## 2023-03-17 RX ORDER — OXYCODONE HYDROCHLORIDE 5 MG/1
5 TABLET ORAL EVERY 4 HOURS PRN
Status: DISCONTINUED | OUTPATIENT
Start: 2023-03-17 | End: 2023-03-18 | Stop reason: HOSPADM

## 2023-03-17 RX ORDER — GLYCOPYRROLATE 0.2 MG/ML
INJECTION INTRAMUSCULAR; INTRAVENOUS PRN
Status: DISCONTINUED | OUTPATIENT
Start: 2023-03-17 | End: 2023-03-17 | Stop reason: SDUPTHER

## 2023-03-17 RX ORDER — AZELASTINE 1 MG/ML
1 SPRAY, METERED NASAL 2 TIMES DAILY PRN
Status: DISCONTINUED | OUTPATIENT
Start: 2023-03-17 | End: 2023-03-18 | Stop reason: HOSPADM

## 2023-03-17 RX ORDER — ALBUTEROL SULFATE 90 UG/1
2 AEROSOL, METERED RESPIRATORY (INHALATION) EVERY 4 HOURS PRN
Status: DISCONTINUED | OUTPATIENT
Start: 2023-03-17 | End: 2023-03-18 | Stop reason: HOSPADM

## 2023-03-17 RX ORDER — SODIUM CHLORIDE 0.9 % (FLUSH) 0.9 %
5-40 SYRINGE (ML) INJECTION EVERY 12 HOURS SCHEDULED
Status: DISCONTINUED | OUTPATIENT
Start: 2023-03-17 | End: 2023-03-18 | Stop reason: HOSPADM

## 2023-03-17 RX ORDER — M-VIT,TX,IRON,MINS/CALC/FOLIC 27MG-0.4MG
1 TABLET ORAL DAILY
Status: DISCONTINUED | OUTPATIENT
Start: 2023-03-17 | End: 2023-03-18 | Stop reason: HOSPADM

## 2023-03-17 RX ORDER — DIPHENHYDRAMINE HYDROCHLORIDE 50 MG/ML
12.5 INJECTION INTRAMUSCULAR; INTRAVENOUS
Status: DISCONTINUED | OUTPATIENT
Start: 2023-03-17 | End: 2023-03-18 | Stop reason: HOSPADM

## 2023-03-17 RX ORDER — HYDRALAZINE HYDROCHLORIDE 20 MG/ML
10 INJECTION INTRAMUSCULAR; INTRAVENOUS
Status: DISCONTINUED | OUTPATIENT
Start: 2023-03-17 | End: 2023-03-17 | Stop reason: HOSPADM

## 2023-03-17 RX ORDER — SODIUM CHLORIDE 9 MG/ML
INJECTION, SOLUTION INTRAVENOUS PRN
Status: DISCONTINUED | OUTPATIENT
Start: 2023-03-17 | End: 2023-03-18 | Stop reason: HOSPADM

## 2023-03-17 RX ORDER — SODIUM CHLORIDE 0.9 % (FLUSH) 0.9 %
5-40 SYRINGE (ML) INJECTION PRN
Status: DISCONTINUED | OUTPATIENT
Start: 2023-03-17 | End: 2023-03-18 | Stop reason: HOSPADM

## 2023-03-17 RX ORDER — MIDAZOLAM HYDROCHLORIDE 1 MG/ML
INJECTION INTRAMUSCULAR; INTRAVENOUS PRN
Status: DISCONTINUED | OUTPATIENT
Start: 2023-03-17 | End: 2023-03-17 | Stop reason: SDUPTHER

## 2023-03-17 RX ORDER — SODIUM CHLORIDE 0.9 % (FLUSH) 0.9 %
5-40 SYRINGE (ML) INJECTION PRN
Status: DISCONTINUED | OUTPATIENT
Start: 2023-03-17 | End: 2023-03-17 | Stop reason: HOSPADM

## 2023-03-17 RX ORDER — SODIUM CHLORIDE, SODIUM LACTATE, POTASSIUM CHLORIDE, CALCIUM CHLORIDE 600; 310; 30; 20 MG/100ML; MG/100ML; MG/100ML; MG/100ML
INJECTION, SOLUTION INTRAVENOUS CONTINUOUS PRN
Status: DISCONTINUED | OUTPATIENT
Start: 2023-03-17 | End: 2023-03-17 | Stop reason: SDUPTHER

## 2023-03-17 RX ORDER — DEXAMETHASONE SODIUM PHOSPHATE 4 MG/ML
INJECTION, SOLUTION INTRA-ARTICULAR; INTRALESIONAL; INTRAMUSCULAR; INTRAVENOUS; SOFT TISSUE PRN
Status: DISCONTINUED | OUTPATIENT
Start: 2023-03-17 | End: 2023-03-17 | Stop reason: SDUPTHER

## 2023-03-17 RX ORDER — OXYCODONE HYDROCHLORIDE 10 MG/1
10 TABLET ORAL EVERY 4 HOURS PRN
Status: DISCONTINUED | OUTPATIENT
Start: 2023-03-17 | End: 2023-03-18 | Stop reason: HOSPADM

## 2023-03-17 RX ORDER — EPHEDRINE SULFATE 50 MG/ML
INJECTION INTRAVENOUS PRN
Status: DISCONTINUED | OUTPATIENT
Start: 2023-03-17 | End: 2023-03-17 | Stop reason: SDUPTHER

## 2023-03-17 RX ORDER — CEFAZOLIN SODIUM 2 G/100ML
2000 INJECTION, SOLUTION INTRAVENOUS ONCE
Status: CANCELLED | OUTPATIENT
Start: 2023-03-17

## 2023-03-17 RX ORDER — SODIUM CHLORIDE, SODIUM LACTATE, POTASSIUM CHLORIDE, CALCIUM CHLORIDE 600; 310; 30; 20 MG/100ML; MG/100ML; MG/100ML; MG/100ML
INJECTION, SOLUTION INTRAVENOUS CONTINUOUS
Status: DISCONTINUED | OUTPATIENT
Start: 2023-03-17 | End: 2023-03-17

## 2023-03-17 RX ORDER — MAGNESIUM SULFATE IN WATER 40 MG/ML
2000 INJECTION, SOLUTION INTRAVENOUS PRN
Status: DISCONTINUED | OUTPATIENT
Start: 2023-03-17 | End: 2023-03-18 | Stop reason: HOSPADM

## 2023-03-17 RX ORDER — METOCLOPRAMIDE HYDROCHLORIDE 5 MG/ML
INJECTION INTRAMUSCULAR; INTRAVENOUS PRN
Status: DISCONTINUED | OUTPATIENT
Start: 2023-03-17 | End: 2023-03-17 | Stop reason: SDUPTHER

## 2023-03-17 RX ORDER — NICOTINE 21 MG/24HR
1 PATCH, TRANSDERMAL 24 HOURS TRANSDERMAL DAILY
Status: DISCONTINUED | OUTPATIENT
Start: 2023-03-17 | End: 2023-03-18 | Stop reason: HOSPADM

## 2023-03-17 RX ORDER — PROPOFOL 10 MG/ML
INJECTION, EMULSION INTRAVENOUS PRN
Status: DISCONTINUED | OUTPATIENT
Start: 2023-03-17 | End: 2023-03-17 | Stop reason: SDUPTHER

## 2023-03-17 RX ORDER — ERGOCALCIFEROL 1.25 MG/1
50000 CAPSULE ORAL WEEKLY
Status: DISCONTINUED | OUTPATIENT
Start: 2023-03-17 | End: 2023-03-18 | Stop reason: HOSPADM

## 2023-03-17 RX ORDER — LIDOCAINE HYDROCHLORIDE 20 MG/ML
INJECTION, SOLUTION INTRAVENOUS PRN
Status: DISCONTINUED | OUTPATIENT
Start: 2023-03-17 | End: 2023-03-17 | Stop reason: SDUPTHER

## 2023-03-17 RX ORDER — IPRATROPIUM BROMIDE AND ALBUTEROL SULFATE 2.5; .5 MG/3ML; MG/3ML
1 SOLUTION RESPIRATORY (INHALATION)
Status: DISCONTINUED | OUTPATIENT
Start: 2023-03-17 | End: 2023-03-18 | Stop reason: HOSPADM

## 2023-03-17 RX ORDER — LABETALOL HYDROCHLORIDE 5 MG/ML
10 INJECTION, SOLUTION INTRAVENOUS
Status: DISCONTINUED | OUTPATIENT
Start: 2023-03-17 | End: 2023-03-17 | Stop reason: HOSPADM

## 2023-03-17 RX ORDER — FENTANYL CITRATE 50 UG/ML
25 INJECTION, SOLUTION INTRAMUSCULAR; INTRAVENOUS EVERY 5 MIN PRN
Status: DISCONTINUED | OUTPATIENT
Start: 2023-03-17 | End: 2023-03-17 | Stop reason: HOSPADM

## 2023-03-17 RX ORDER — FENTANYL CITRATE 50 UG/ML
INJECTION, SOLUTION INTRAMUSCULAR; INTRAVENOUS PRN
Status: DISCONTINUED | OUTPATIENT
Start: 2023-03-17 | End: 2023-03-17 | Stop reason: SDUPTHER

## 2023-03-17 RX ORDER — ACETAMINOPHEN 325 MG/1
650 TABLET ORAL EVERY 6 HOURS PRN
Status: DISCONTINUED | OUTPATIENT
Start: 2023-03-17 | End: 2023-03-18 | Stop reason: HOSPADM

## 2023-03-17 RX ORDER — SODIUM CHLORIDE 0.9 % (FLUSH) 0.9 %
5-40 SYRINGE (ML) INJECTION EVERY 12 HOURS SCHEDULED
Status: DISCONTINUED | OUTPATIENT
Start: 2023-03-17 | End: 2023-03-17 | Stop reason: HOSPADM

## 2023-03-17 RX ORDER — CLOBETASOL PROPIONATE 0.05 G/100ML
1 SHAMPOO TOPICAL DAILY PRN
Status: DISCONTINUED | OUTPATIENT
Start: 2023-03-17 | End: 2023-03-18 | Stop reason: HOSPADM

## 2023-03-17 RX ORDER — ACETAMINOPHEN 650 MG/1
650 SUPPOSITORY RECTAL EVERY 6 HOURS PRN
Status: DISCONTINUED | OUTPATIENT
Start: 2023-03-17 | End: 2023-03-18 | Stop reason: HOSPADM

## 2023-03-17 RX ORDER — MEPERIDINE HYDROCHLORIDE 25 MG/ML
12.5 INJECTION INTRAMUSCULAR; INTRAVENOUS; SUBCUTANEOUS EVERY 5 MIN PRN
Status: DISCONTINUED | OUTPATIENT
Start: 2023-03-17 | End: 2023-03-17 | Stop reason: HOSPADM

## 2023-03-17 RX ORDER — MAGNESIUM HYDROXIDE/ALUMINUM HYDROXICE/SIMETHICONE 120; 1200; 1200 MG/30ML; MG/30ML; MG/30ML
30 SUSPENSION ORAL EVERY 6 HOURS PRN
Status: DISCONTINUED | OUTPATIENT
Start: 2023-03-17 | End: 2023-03-18 | Stop reason: HOSPADM

## 2023-03-17 RX ORDER — POLYETHYLENE GLYCOL 3350 17 G/17G
17 POWDER, FOR SOLUTION ORAL DAILY PRN
Status: DISCONTINUED | OUTPATIENT
Start: 2023-03-17 | End: 2023-03-18 | Stop reason: HOSPADM

## 2023-03-17 RX ORDER — CETIRIZINE HYDROCHLORIDE 10 MG/1
10 TABLET ORAL DAILY PRN
Status: DISCONTINUED | OUTPATIENT
Start: 2023-03-17 | End: 2023-03-18 | Stop reason: HOSPADM

## 2023-03-17 RX ORDER — FLUTICASONE PROPIONATE 50 MCG
2 SPRAY, SUSPENSION (ML) NASAL DAILY
Status: DISCONTINUED | OUTPATIENT
Start: 2023-03-17 | End: 2023-03-18 | Stop reason: HOSPADM

## 2023-03-17 RX ORDER — POTASSIUM CHLORIDE 7.45 MG/ML
10 INJECTION INTRAVENOUS PRN
Status: DISCONTINUED | OUTPATIENT
Start: 2023-03-17 | End: 2023-03-18 | Stop reason: HOSPADM

## 2023-03-17 RX ORDER — CYANOCOBALAMIN 1000 UG/ML
1000 INJECTION, SOLUTION INTRAMUSCULAR; SUBCUTANEOUS
Status: DISCONTINUED | OUTPATIENT
Start: 2023-03-17 | End: 2023-03-18 | Stop reason: HOSPADM

## 2023-03-17 RX ORDER — DROPERIDOL 2.5 MG/ML
0.62 INJECTION, SOLUTION INTRAMUSCULAR; INTRAVENOUS EVERY 10 MIN PRN
Status: DISCONTINUED | OUTPATIENT
Start: 2023-03-17 | End: 2023-03-18 | Stop reason: HOSPADM

## 2023-03-17 RX ORDER — POTASSIUM CHLORIDE 20 MEQ/1
40 TABLET, EXTENDED RELEASE ORAL PRN
Status: DISCONTINUED | OUTPATIENT
Start: 2023-03-17 | End: 2023-03-18 | Stop reason: HOSPADM

## 2023-03-17 RX ADMIN — SODIUM CHLORIDE, POTASSIUM CHLORIDE, SODIUM LACTATE AND CALCIUM CHLORIDE: 600; 310; 30; 20 INJECTION, SOLUTION INTRAVENOUS at 18:29

## 2023-03-17 RX ADMIN — DEXAMETHASONE SODIUM PHOSPHATE 4 MG: 4 INJECTION, SOLUTION INTRAMUSCULAR; INTRAVENOUS at 15:45

## 2023-03-17 RX ADMIN — CEFAZOLIN 2000 MG: 2 INJECTION, POWDER, FOR SOLUTION INTRAMUSCULAR; INTRAVENOUS at 15:45

## 2023-03-17 RX ADMIN — CYANOCOBALAMIN 1000 MCG: 1000 INJECTION, SOLUTION INTRAMUSCULAR at 09:30

## 2023-03-17 RX ADMIN — LIDOCAINE HYDROCHLORIDE 100 MG: 20 INJECTION, SOLUTION INTRAVENOUS at 15:41

## 2023-03-17 RX ADMIN — SODIUM CHLORIDE, PRESERVATIVE FREE 10 ML: 5 INJECTION INTRAVENOUS at 20:27

## 2023-03-17 RX ADMIN — GLYCOPYRROLATE 0.2 MG: 0.2 INJECTION, SOLUTION INTRAMUSCULAR; INTRAVENOUS at 15:38

## 2023-03-17 RX ADMIN — MIDAZOLAM 2 MG: 1 INJECTION INTRAMUSCULAR; INTRAVENOUS at 15:38

## 2023-03-17 RX ADMIN — CEFTRIAXONE SODIUM 1000 MG: 1 INJECTION, POWDER, FOR SOLUTION INTRAMUSCULAR; INTRAVENOUS at 21:59

## 2023-03-17 RX ADMIN — SODIUM CHLORIDE, POTASSIUM CHLORIDE, SODIUM LACTATE AND CALCIUM CHLORIDE: 600; 310; 30; 20 INJECTION, SOLUTION INTRAVENOUS at 12:36

## 2023-03-17 RX ADMIN — EPHEDRINE SULFATE 10 MG: 50 INJECTION INTRAVENOUS at 16:00

## 2023-03-17 RX ADMIN — OXYCODONE HYDROCHLORIDE 5 MG: 5 TABLET ORAL at 20:26

## 2023-03-17 RX ADMIN — FENTANYL CITRATE 100 MCG: 50 INJECTION, SOLUTION INTRAMUSCULAR; INTRAVENOUS at 15:52

## 2023-03-17 RX ADMIN — EPHEDRINE SULFATE 10 MG: 50 INJECTION INTRAVENOUS at 15:56

## 2023-03-17 RX ADMIN — SODIUM CHLORIDE, POTASSIUM CHLORIDE, SODIUM LACTATE AND CALCIUM CHLORIDE: 600; 310; 30; 20 INJECTION, SOLUTION INTRAVENOUS at 01:18

## 2023-03-17 RX ADMIN — METOCLOPRAMIDE 10 MG: 5 INJECTION, SOLUTION INTRAMUSCULAR; INTRAVENOUS at 15:45

## 2023-03-17 RX ADMIN — ENOXAPARIN SODIUM 40 MG: 100 INJECTION SUBCUTANEOUS at 09:30

## 2023-03-17 RX ADMIN — SODIUM CHLORIDE, SODIUM LACTATE, POTASSIUM CHLORIDE, CALCIUM CHLORIDE: 600; 310; 30; 20 INJECTION, SOLUTION INTRAVENOUS at 15:38

## 2023-03-17 RX ADMIN — PROPOFOL 150 MG: 10 INJECTION, EMULSION INTRAVENOUS at 15:41

## 2023-03-17 ASSESSMENT — PAIN DESCRIPTION - FREQUENCY
FREQUENCY: CONTINUOUS
FREQUENCY: CONTINUOUS

## 2023-03-17 ASSESSMENT — LIFESTYLE VARIABLES: SMOKING_STATUS: 1

## 2023-03-17 ASSESSMENT — PAIN DESCRIPTION - ORIENTATION
ORIENTATION: RIGHT
ORIENTATION: RIGHT

## 2023-03-17 ASSESSMENT — PAIN SCALES - GENERAL
PAINLEVEL_OUTOF10: 0
PAINLEVEL_OUTOF10: 0
PAINLEVEL_OUTOF10: 2
PAINLEVEL_OUTOF10: 4
PAINLEVEL_OUTOF10: 0
PAINLEVEL_OUTOF10: 4

## 2023-03-17 ASSESSMENT — PAIN DESCRIPTION - LOCATION
LOCATION: BACK
LOCATION: ABDOMEN

## 2023-03-17 ASSESSMENT — PAIN DESCRIPTION - PAIN TYPE
TYPE: ACUTE PAIN
TYPE: ACUTE PAIN

## 2023-03-17 ASSESSMENT — PAIN - FUNCTIONAL ASSESSMENT
PAIN_FUNCTIONAL_ASSESSMENT: ACTIVITIES ARE NOT PREVENTED
PAIN_FUNCTIONAL_ASSESSMENT: 0-10

## 2023-03-17 ASSESSMENT — PAIN DESCRIPTION - ONSET: ONSET: ON-GOING

## 2023-03-17 ASSESSMENT — PAIN DESCRIPTION - DESCRIPTORS
DESCRIPTORS: DULL;ACHING
DESCRIPTORS: ACHING

## 2023-03-17 NOTE — PROGRESS NOTES
1609- pt. Arrived to pacu via bed from OR. Pt. Attached to monitor and alarms are on. [facility] Pt. Is drowsy from anesthesia and has an oral airway present. Pt. Has a simple mask on at 10 L. SR on the monitor. IV infusing without any issues. SCDs are turned on.    1625- pt. Now responding to verbal stimuli. Oral airway removed. 10L Via simple mask placed back on pt. Pt. Denies pain or n/v at this time. 1646-Pt. Awake and alert. She denies pain or n/v. Pt. Is on RA sating 99%. SR on the monitor. Pt. IV infusing without any issues. Pt. Tolerated ice chips without any issues. At this time pt. Is ready to transfer back to floor. Called and gave report to receiving nurse,  Nito strong. Pt. Denies any other questions or concerns.

## 2023-03-17 NOTE — CARE COORDINATION
MCG criteria for Renal colic, kidney stone  reviewed at this time, criteria supports Inpatient Admission. BAM,RN/CM

## 2023-03-17 NOTE — CARE COORDINATION
Chart reviewed. Patient from home, independent prior to admission. Patient has PCP and insurance. No needs identified at this time. CM available should any new needs arise. general surgery

## 2023-03-17 NOTE — H&P
V2.0  History and Physical      Name:  Saumya Mathews /Age/Sex: 1965  (62 y.o. female)   MRN & CSN:  [id number] & [id number] Encounter Date/Time: 3/17/2023 12:00 AM EDT   Location:  ED27/ED-27 PCP: Jason Anaya MD       Hospital Day: 2    Assessment and Plan:   Saumya Mathews is a 62 y.o. female with a pmh of allergies who presents with Nephrolithiasis    Hospital Problems             Last Modified POA    * (Principal) Nephrolithiasis 3/16/2023 Yes     Past Medical History:   Diagnosis Date    Dental abscess 2015    [facility] ED    History of cardiac monitoring 2021    Lowest HR=63. Max=124. Avg.=96/ 1.1 second episode of SVT. Artifact noted. Pneumonia 1988    Psoriasis     Spondylolysis 09/10/2012    Bilateral L5 pars defects, CT done 9-10-12 [facility] EVALUATION [place]         3 mm right ureteropelvic junction renal calculus:  -Admit to medicine  -Telemetry  -Status post 1 L IV fluids  -Continue maintenance IV fluids  -Status post Rocephin 1 g IV in the ED  -Continue Rocephin 1 g IV every 24 hours  -Follow urine cultures  -Follow fever/WBC curve  -CT A/P with increased moderate right hydronephroureter process with 3 mm right ureteral calculus at the ureteropelvic junction, migrated distally since previous imaging  -Pain control  -Antiemetics  -Urology consult  -Patient n.p.o. from midnight for possible procedure in the a.m.   HANNAH:        -Follow Urine indices, bladder scan and repeat Cr after IV fluids  Allergies:  -Continue home medications as needed  TRANSAMINITIS:        - Raised ALP, follow GGT, hepatitis panel and RUQ US  Miscellaneous:  -Continue home medications except as contraindicated    Disposition:   Current Living situation: Home  Expected Disposition: Home  Estimated D/C: 2 days    Diet Diet NPO   DVT Prophylaxis [x] Lovenox, []  Heparin, [] SCDs, [] Ambulation,  [] Eliquis, [] Xarelto, [] Coumadin   Code Status No Order   Surrogate Decision Maker/ POA Self     History from:     patient    History of Present Illness:     Chief Complaint: Right flank pain  Yaima Tracey is a 62 y.o. female with pmh of allergies who presents with right flank pain since Sunday. Patient was recently seen in the ED for right flank pain and found to have nephrolithiasis, upon discharge patient had  recurrent symptoms with right flank pain radiating down to the right groin, crampy and intermittent. Review of system otherwise unremarkable. Vital signs in the ED stable, labs with leukocytosis 13.2k, BUN 20/creatinine 1.4, ALT 87/AST 76/, urinalysis with 40 ketones, 340 RBC. CT A/P with Increased moderate right hydroureteronephrosis secondary to a 3 mm right ureteral calculus that has migrated distally to the right ureteropelvic junction. Case was discussed with urology as per ED and patient was given 1 L normal saline bolus, ceftriaxone, fentanyl, ketorolac and promethazine and is currently being evaluated for possible urological procedure in the a.m. Review of Systems: Need 10 Elements   Review of Systems    10 point review of system unremarkable except as above    Objective:   No intake or output data in the 24 hours ending 03/17/23 0000   Vitals:   Vitals:    03/16/23 1644 03/16/23 2145 03/16/23 2250   BP: (!) 143/90 127/85 136/84   Pulse: 94 94 89   Resp: 22 22 16   Temp: 97.6 °F (36.4 °C) 97.6 °F (36.4 °C) 97.8 °F (36.6 °C)   TempSrc: Oral Oral Oral   SpO2: 95%  98%       Medications Prior to Admission     Prior to Admission medications    Medication Sig Start Date End Date Taking?  Authorizing Provider   naproxen (NAPROSYN) 500 MG tablet Take 1 tablet by mouth 2 times daily 3/13/23   Jose Musa DO   promethazine (PROMETHEGAN) 25 MG suppository Place 1 suppository rectally every 6 hours as needed for Nausea 3/13/23 3/20/23  Jose Musa DO   loratadine (CLARITIN) 10 MG tablet TAKE 1 TABLET BY MOUTH EVERY DAY 2/23/22   Harsha Barajas MD   Insulin Syringe-Needle U-100 (INSULIN SYRINGE .5CC/31GX5/16\") 31G X 5/16\" 0.5 ML MISC 1 each by Does not apply route daily 12/8/21   Erick Gutierrez MD   montelukast (SINGULAIR) 10 MG tablet TAKE 1 TABLET BY MOUTH EVERY DAY  Patient not taking: Reported on 1/26/2022 11/16/21   Erick Gutierrez MD   SYRINGE-NEEDLE, DISP, 3 ML (Jenny Fraise) 23G X 1\" 3 ML MISC Use once a wk 9/17/21   Erick Gutierrez MD   cyanocobalamin 1000 MCG/ML injection Inject 1 mL into the muscle every 30 days 9/14/21   Erick Gutierrez MD   ergocalciferol (DRISDOL) 1.25 MG (83776 UT) capsule Take 1 capsule by mouth once a week 6/23/21   Erick Gutierrez MD   Clobetasol Propionate 0.05 % SHAM Apply 1 Dose topically daily as needed (psoriasis) 3/4/21   Lovella SHEREE Cox - CNP   albuterol sulfate  (90 Base) MCG/ACT inhaler INHALE 2 PUFFS INTO THE LUNGS 4 TIMES DAILY AS NEEDED FOR WHEEZING 12/28/20   Lovella Sine, APRN - CNP   azelastine (ASTELIN) 0.1 % nasal spray 1 spray by Nasal route 2 times daily Use in each nostril as directed 6/24/20   Lovella Kenny APRN - CNP   fluticasone Enrico Ki) 50 MCG/ACT nasal spray 2 sprays by Each Nostril route daily 4/3/20   Vinicio Ahn DO   Naproxen Sodium (ALEVE) 220 MG CAPS Take 1 tablet by mouth as needed for Pain. Historical Provider, MD   Multiple Vitamin (MULTIVITAMIN PO) Take 1 tablet by mouth daily. 2/15/11   Historical Provider, MD       Physical Exam: Need 8 Elements   Physical Exam     General: NAD  Eyes: EOMI  ENT: neck supple  Cardiovascular: Regular rate. Respiratory: Clear to auscultation  Gastrointestinal: Soft, non tender  Genitourinary: Right-sided CVA tenderness  Musculoskeletal: No edema  Skin: warm, dry  Neuro: Alert. Psych: Mood appropriate. Past Medical History:   PMHx   Past Medical History:   Diagnosis Date    Dental abscess 01/01/2015    [facility] ED    History of cardiac monitoring 07/01/2021    Lowest HR=63. Max=124. Avg.=96/ 1.1 second episode of SVT. Artifact noted. Pneumonia 01/01/1988    Psoriasis     Spondylolysis 09/10/2012    Bilateral L5 pars defects, CT done 9-10-12 [facility] [place]     PSHX:  has a past surgical history that includes Cholecystectomy (1988); Tubal ligation (1993); Ectopic pregnancy surgery (1996); Hysterectomy (2004); and Hysterectomy, vaginal.  Allergies: Allergies   Allergen Reactions    Codeine      Stomach cramps    Tramadol     Zofran [Ondansetron Hcl] Other (See Comments)     Severe abd pain     Fam HX:  family history includes Allergies in her father; Cancer in her mother; Depression in her son; Diabetes in her mother and son; High Blood Pressure in her mother; High Cholesterol in her mother; Obesity in her mother; Stroke in her maternal grandfather.   Soc HX:   Social History     Socioeconomic History    Marital status: Single     Spouse name: None    Number of children: None    Years of education: None    Highest education level: None   Tobacco Use    Smoking status: Every Day     Packs/day: 1.50     Years: 25.00     Pack years: 37.50     Types: Cigarettes     Start date: 12/19/1980    Smokeless tobacco: Never   Vaping Use    Vaping Use: Never used   Substance and Sexual Activity    Alcohol use: Yes     Comment: occassionally    Drug use: No    Sexual activity: Not Currently       Medications:   Medications:    Infusions:   PRN Meds:     Labs      CBC:   Recent Labs     03/16/23  1730   WBC 13.2*   HGB 15.6        BMP:    Recent Labs     03/16/23  1730      K 4.4      CO2 23   BUN 20   CREATININE 1.4*   GLUCOSE 110*     Hepatic:   Recent Labs     03/16/23  1730   AST 76*   ALT 87*   BILITOT 0.6   ALKPHOS 130*     Lipids:   Lab Results   Component Value Date/Time    CHOL 224 06/15/2021 11:50 AM    HDL 70 06/15/2021 11:50 AM    TRIG 67 06/15/2021 11:50 AM     Hemoglobin A1C: No results found for: LABA1C  TSH: No results found for: TSH  Troponin: No results found for: TROPONINT  Lactic Acid: No results for input(s): LACTA in the last 72 hours. BNP: No results for input(s): PROBNP in the last 72 hours. UA:  Lab Results   Component Value Date/Time    NITRU NEGATIVE 03/16/2023 05:24 PM    COLORU YELLOW 03/16/2023 05:24 PM    PHUR 5.5 06/23/2021 04:42 PM    WBCUA NONE SEEN 03/16/2023 05:24 PM    RBCUA 340 03/16/2023 05:24 PM    MUCUS MODERATE 03/16/2023 05:24 PM    TRICHOMONAS NONE SEEN 03/16/2023 05:24 PM    BACTERIA NEGATIVE 03/16/2023 05:24 PM    CLARITYU CLEAR 03/16/2023 05:24 PM    SPECGRAV >1.030 03/16/2023 05:24 PM    LEUKOCYTESUR NEGATIVE 03/16/2023 05:24 PM    UROBILINOGEN 0.2 03/16/2023 05:24 PM    BILIRUBINUR SMALL NUMBER OR AMOUNT OBSERVED 03/16/2023 05:24 PM    BILIRUBINUR small 06/23/2021 04:42 PM    BLOODU LARGE NUMBER OR AMOUNT OBSERVED 03/16/2023 05:24 PM    GLUCOSEU negative 06/23/2021 04:42 PM    KETUA 40 03/16/2023 05:24 PM     Urine Cultures: No results found for: LABURIN  Blood Cultures: No results found for: BC  No results found for: BLOODCULT2  Organism: No results found for: ORG    Imaging/Diagnostics Last 24 Hours   CT ABDOMEN PELVIS WO CONTRAST Additional Contrast? None    Result Date: 3/16/2023  EXAMINATION: CT OF THE ABDOMEN AND PELVIS WITHOUT CONTRAST 3/16/2023 7:40 pm TECHNIQUE: CT of the abdomen and pelvis was performed without the administration of intravenous contrast. Multiplanar reformatted images are provided for review. Automated exposure control, iterative reconstruction, and/or weight based adjustment of the mA/kV was utilized to reduce the radiation dose to as low as reasonably achievable.  COMPARISON: 03/12/2023 HISTORY: ORDERING SYSTEM PROVIDED HISTORY: kidney stones, dysuria rule out stones TECHNOLOGIST PROVIDED HISTORY: Reason for exam:->kidney stones, dysuria rule out stones Additional Contrast?->None Decision Support Exception - unselect if not a suspected or confirmed emergency medical condition->Emergency Medical Condition (MA) Reason for Exam: kidney stones, dysuria FINDINGS: Lower Chest: No acute abnormality in the visualized lower thorax. Chronic calcified granulomatous changes in the right lower lobe. Organs: Limited evaluation due lack of intravenous contrast.  Prior cholecystectomy. No biliary ductal dilatation. Liver, pancreas, spleen, adrenal glands, and left kidney are unremarkable. There is no left hydronephrosis or left renal calculus. Increased moderate right hydronephrosis proximal to a 3 mm calculus that has migrated distally to the right ureterovesical junction. GI/Bowel: No acute bowel abnormality. No evidence of appendicitis. Pelvis: Urinary bladder and pelvic organs are unremarkable. Peritoneum/Retroperitoneum: There is no intraperitoneal free air or free fluid. There is no pathologically enlarged or morphologically abnormal lymph node. The abdominal aorta is normal in caliber with mild calcifications. Bones/Soft Tissues: No acute osseous or soft tissue abnormality is seen. There is unchanged grade 1 anterolisthesis of L5 on S1 secondary to bilateral L5 spondylolysis. Increased moderate right hydroureteronephrosis secondary to a 3 mm right ureteral calculus that has migrated distally to the right ureteropelvic junction. CT ABDOMEN PELVIS WO CONTRAST    Result Date: 3/12/2023  EXAMINATION: CT OF THE ABDOMEN AND PELVIS WITHOUT CONTRAST 3/12/2023 10:17 am TECHNIQUE: CT of the abdomen and pelvis was performed without the administration of intravenous contrast. Multiplanar reformatted images are provided for review. Automated exposure control, iterative reconstruction, and/or weight based adjustment of the mA/kV was utilized to reduce the radiation dose to as low as reasonably achievable. COMPARISON: 01/11/2019 HISTORY: ORDERING SYSTEM PROVIDED HISTORY: flank pain TECHNOLOGIST PROVIDED HISTORY: Reason for exam:->flank pain FINDINGS: Lower Chest: The lower mediastinal structures are unremarkable. Lung bases are clear.  Organs: Mild-to-moderate right-sided hydronephrosis, related to a 5 mm stone within the proximal right ureter. On the left, no evidence of nephrolithiasis or hydronephrosis. No acute abnormality involving the unenhanced liver, pancreas, spleen, or adrenal glands. Status post cholecystectomy. GI/Bowel: The small bowel is normal in caliber. Acute, focal inflammatory changes are identified involving the GI tract. Moderate colonic stool burden predominately in the right colon. Pelvis: The urinary bladder and distal ureters are unremarkable. No free fluid in the pelvis. No pelvic lymphadenopathy. Peritoneum/Retroperitoneum: The abdominal aorta is normal in caliber. Bones/Soft Tissues: No acute osseous abnormality. Bilateral pars defects at L5 with grade 1 anterolisthesis. 1. Mild-to-moderate right-sided hydronephrosis, secondary to a 5 mm proximal right ureteral stone. 2. Moderate colonic stool burden which could indicate constipation.        Personally reviewed Lab Studies, Imaging    Electronically signed by Todd Das MD on 3/17/2023 at 12:00 AM

## 2023-03-17 NOTE — ED PROVIDER NOTES
Emergency Department Encounter    Patient: Diamond Chapin  MRN: [id number]  : 1965  Date of Evaluation: 3/16/2023  ED Provider:  Marielle Robbins DO    Triage Chief Complaint:   Abdominal Pain    [place]:  Diamond Chapin is a 62 y.o. female that presents ***    ROS - see HPI, below listed is current ROS at time of my eval:  General:  No fevers, no chills, no weakness  Eyes:  No recent vison changes, no discharge  ENT:  No sore throat, no nasal congestion, no hearing changes  Cardiovascular:  No chest pain, no palpitations  Respiratory:  No shortness of breath, no cough, no wheezing  Gastrointestinal:  No pain, no nausea, no vomiting, no diarrhea  Musculoskeletal:  No muscle pain, no joint pain  Skin:  No rash, no pruritis, no easy bruising  Neurologic:  No speech problems, no headache, no extremity numbness, no extremity tingling, no extremity weakness  Psychiatric:  No anxiety  Genitourinary:  No dysuria, no hematuria  Endocrine:  No unexpected weight gain, no unexpected weight loss  Extremities:  no edema, no pain    Past Medical History:   Diagnosis Date    Dental abscess 2015    [facility] ED    History of cardiac monitoring 2021    Lowest HR=63. Max=124. Avg.=96/ 1.1 second episode of SVT. Artifact noted.     Pneumonia 1988    Psoriasis     Spondylolysis 09/10/2012    Bilateral L5 pars defects, CT done 9-10-12 [facility]     Past Surgical History:   Procedure Laterality Date    CHOLECYSTECTOMY      gall stones    ECTOPIC PREGNANCY SURGERY      HYSTERECTOMY (CERVIX STATUS UNKNOWN)  2004    benign tumor right ovary    HYSTERECTOMY, VAGINAL      TUBAL LIGATION       Family History   Problem Relation Age of Onset    Cancer Mother     Diabetes Mother     High Blood Pressure Mother     Obesity Mother     High Cholesterol Mother     Allergies Father     Diabetes Son     Depression Son     Stroke Maternal Grandfather      Social History     Socioeconomic History    Marital status: Single     Spouse name: Not on file    Number of children: Not on file    Years of education: Not on file    Highest education level: Not on file   Occupational History    Not on file   Tobacco Use    Smoking status: Every Day     Packs/day: 1.50     Years: 25.00     Pack years: 37.50     Types: Cigarettes     Start date: 12/19/1980    Smokeless tobacco: Never   Vaping Use    Vaping Use: Never used   Substance and Sexual Activity    Alcohol use: Yes     Comment: occassionally    Drug use: No    Sexual activity: Not Currently   Other Topics Concern    Not on file   Social History Narrative    Not on file     Social Determinants of Health     Financial Resource Strain: Not on file   Food Insecurity: Not on file   Transportation Needs: Not on file   Physical Activity: Not on file   Stress: Not on file   Social Connections: Not on file   Intimate Partner Violence: Not on file   Housing Stability: Not on file     Current Facility-Administered Medications   Medication Dose Route Frequency Provider Last Rate Last Admin    cefTRIAXone (ROCEPHIN) 1,000 mg in sodium chloride 0.9 % 50 mL IVPB (mini-bag)  1,000 mg IntraVENous [facility]         Current Outpatient Medications   Medication Sig Dispense Refill    naproxen (NAPROSYN) 500 MG tablet Take 1 tablet by mouth 2 times daily 20 tablet 0    promethazine (PROMETHEGAN) 25 MG suppository Place 1 suppository rectally every 6 hours as needed for Nausea 10 suppository 2    loratadine (CLARITIN) 10 MG tablet TAKE 1 TABLET BY MOUTH EVERY DAY 30 tablet 5    Insulin Syringe-Needle U-100 (INSULIN SYRINGE .5CC/31GX5/16\") 31G X 5/16\" 0.5 ML MISC 1 each by Does not apply route daily 5 each 1    montelukast (SINGULAIR) 10 MG tablet TAKE 1 TABLET BY MOUTH EVERY DAY (Patient not taking: Reported on 1/26/2022) 30 tablet 3    SYRINGE-NEEDLE, DISP, 3 ML (SECURESAFE SYRINGE/NEEDLE) 23G X 1\" 3 ML MISC Use once a wk 50 each 0    cyanocobalamin 1000 MCG/ML injection Inject 1 mL into the muscle every 30 days 1 mL 5    ergocalciferol (DRISDOL) 1.25 MG (07205 UT) capsule Take 1 capsule by mouth once a week 4 capsule 5    Clobetasol Propionate 0.05 % SHAM Apply 1 Dose topically daily as needed (psoriasis) 1 Bottle 1    albuterol sulfate  (90 Base) MCG/ACT inhaler INHALE 2 PUFFS INTO THE LUNGS 4 TIMES DAILY AS NEEDED FOR WHEEZING 6.7 Inhaler 0    azelastine (ASTELIN) 0.1 % nasal spray 1 spray by Nasal route 2 times daily Use in each nostril as directed 1 Bottle 0    fluticasone (FLONASE) 50 MCG/ACT nasal spray 2 sprays by Each Nostril route daily 1 Bottle 0    Naproxen Sodium (ALEVE) 220 MG CAPS Take 1 tablet by mouth as needed for Pain. Multiple Vitamin (MULTIVITAMIN PO) Take 1 tablet by mouth daily. Allergies   Allergen Reactions    Codeine      Stomach cramps    Tramadol     Zofran [Ondansetron Hcl] Other (See Comments)     Severe abd pain       Nursing Notes Reviewed    Physical Exam:  Triage VS:    ED Triage Vitals [03/16/23 1644]   Enc Vitals Group      BP (!) 143/90      Heart Rate 94      Resp 22      Temp 97.6 °F (36.4 °C)      Temp Source Oral      SpO2 95 %      Weight       Height       Head Circumference       Peak Flow       Pain Score       Pain Loc       Pain Edu? Excl. in [address]? My pulse ox interpretation is - normal    General appearance:  No acute distress. Skin:  Warm. Dry. Eye:  Extraocular movements intact. Ears, nose, mouth and throat:  Oral mucosa moist   Neck:  Trachea midline. Extremity:  No swelling. Normal ROM     Heart:  Regular rate and rhythm, normal S1 & S2, no extra heart sounds. Perfusion:  intact  Respiratory:  Lungs clear to auscultation bilaterally. Respirations nonlabored. Abdominal:  Normal bowel sounds. Soft. Nontender. Non distended. Back:  No CVA tenderness to palpation     Neurological:  Alert and oriented times 3. No focal neuro deficits.              Psychiatric: Appropriate    I have reviewed and interpreted all of the currently available lab results from this visit (if applicable):  Results for orders placed or performed during the hospital encounter of 03/16/23   Urinalysis   Result Value Ref Range    Color, UA YELLOW YELLOW    Clarity, UA CLEAR CLEAR    Glucose, Urine NEGATIVE NEGATIVE MG/DL    Bilirubin Urine SMALL NUMBER OR AMOUNT OBSERVED (A) NEGATIVE MG/DL    Ketones, Urine 40 (A) NEGATIVE MG/DL    Specific Gravity, UA >1.030 1.001 - 1.035    Blood, Urine LARGE NUMBER OR AMOUNT OBSERVED (A) NEGATIVE    pH, Urine 5.5 5.0 - 8.0    Protein, UA 30 (A) NEGATIVE MG/DL    Urobilinogen, Urine 0.2 0.2 - 1.0 MG/DL    Nitrite Urine, Quantitative NEGATIVE NEGATIVE    Leukocyte Esterase, Urine NEGATIVE NEGATIVE   CBC with Auto Differential   Result Value Ref Range    WBC 13.2 (H) 4.0 - 10.5 K/CU MM    RBC 5.42 (H) 4.2 - 5.4 M/CU MM    Hemoglobin 15.6 12.5 - 16.0 GM/DL    Hematocrit 47.1 (H) 37 - 47 %    MCV 86.9 78 - 100 FL    MCH 28.8 27 - 31 PG    MCHC 33.1 32.0 - 36.0 %    RDW 13.7 11.7 - 14.9 %    Platelets 446 254 - 930 K/CU MM    MPV 10.6 7.5 - 11.1 FL    Differential Type AUTOMATED DIFFERENTIAL     Segs Relative 80.3 (H) 36 - 66 %    Lymphocytes % 9.2 (L) 24 - 44 %    Monocytes % 7.5 (H) 0 - 4 %    Eosinophils % 2.4 0 - 3 %    Basophils % 0.4 0 - 1 %    Segs Absolute 10.6 K/CU MM    Lymphocytes Absolute 1.2 K/CU MM    Monocytes Absolute 1.0 K/CU MM    Eosinophils Absolute 0.3 K/CU MM    Basophils Absolute 0.1 K/CU MM    Nucleated RBC % 0.0 %    Total Nucleated RBC 0.0 K/CU MM    Total Immature Neutrophil 0.03 K/CU MM    Immature Neutrophil % 0.2 0 - 0.43 %   Comprehensive Metabolic Panel   Result Value Ref Range    Sodium 135 135 - 145 MMOL/L    Potassium 4.4 3.5 - 5.1 MMOL/L    Chloride 101 99 - 110 mMol/L    CO2 23 21 - 32 MMOL/L    BUN 20 6 - 23 MG/DL    Creatinine 1.4 (H) 0.6 - 1.1 MG/DL    Est, Glom Filt Rate 44 (L) >60 mL/min/1.73m2    Glucose 110 (H) 70 - 99 MG/DL Calcium 10.1 8.3 - 10.6 MG/DL    Albumin 4.2 3.4 - 5.0 GM/DL    Total Protein 7.2 6.4 - 8.2 GM/DL    Total Bilirubin 0.6 0.0 - 1.0 MG/DL    ALT 87 (H) 10 - 40 U/L    AST 76 (H) 15 - 37 IU/L    Alkaline Phosphatase 130 (H) 40 - 129 IU/L    Anion Gap 11 4 - 16   Lipase   Result Value Ref Range    Lipase 16 13 - 60 IU/L   Microscopic Urinalysis   Result Value Ref Range    RBC,  (H) 0 - 6 /HPF    WBC, UA NONE SEEN 0 - 5 /HPF    Bacteria, UA NEGATIVE NEGATIVE /HPF    Squam Epithel, UA 5 /HPF    Mucus, UA MODERATE (A) NEGATIVE HPF    Trichomonas, UA NONE SEEN NONE SEEN /HPF    Ca Oxalate Gala, UA OCCASIONAL /HPF      Radiographs (if obtained):  Radiologist's Report Reviewed:  CT ABDOMEN PELVIS WO CONTRAST Additional Contrast? None    Result Date: 3/16/2023  EXAMINATION: CT OF THE ABDOMEN AND PELVIS WITHOUT CONTRAST 3/16/2023 7:40 pm TECHNIQUE: CT of the abdomen and pelvis was performed without the administration of intravenous contrast. Multiplanar reformatted images are provided for review. Automated exposure control, iterative reconstruction, and/or weight based adjustment of the mA/kV was utilized to reduce the radiation dose to as low as reasonably achievable. COMPARISON: 03/12/2023 HISTORY: ORDERING SYSTEM PROVIDED HISTORY: kidney stones, dysuria rule out stones TECHNOLOGIST PROVIDED HISTORY: Reason for exam:->kidney stones, dysuria rule out stones Additional Contrast?->None Decision Support Exception - unselect if not a suspected or confirmed emergency medical condition->Emergency Medical Condition (MA) Reason for Exam: kidney stones, dysuria FINDINGS: Lower Chest: No acute abnormality in the visualized lower thorax. Chronic calcified granulomatous changes in the right lower lobe. Organs: Limited evaluation due lack of intravenous contrast.  Prior cholecystectomy. No biliary ductal dilatation. Liver, pancreas, spleen, adrenal glands, and left kidney are unremarkable.   There is no left hydronephrosis or left renal calculus. Increased moderate right hydronephrosis proximal to a 3 mm calculus that has migrated distally to the right ureterovesical junction. GI/Bowel: No acute bowel abnormality. No evidence of appendicitis. Pelvis: Urinary bladder and pelvic organs are unremarkable. Peritoneum/Retroperitoneum: There is no intraperitoneal free air or free fluid. There is no pathologically enlarged or morphologically abnormal lymph node. The abdominal aorta is normal in caliber with mild calcifications. Bones/Soft Tissues: No acute osseous or soft tissue abnormality is seen. There is unchanged grade 1 anterolisthesis of L5 on S1 secondary to bilateral L5 spondylolysis. Increased moderate right hydroureteronephrosis secondary to a 3 mm right ureteral calculus that has migrated distally to the right ureteropelvic junction. EKG (if obtained): (All EKG's are interpreted by myself in the absence of a cardiologist)      MDM:  ***    I did speak with on-call urologist Dr. Eliud Xiao at 9:30 PM he recommended patient be n.p.o. after midnight starting Rocephin 1 g IV and he will put her on the case for tomorrow. Clinical Impression:  1. Hydronephrosis with urinary obstruction due to ureteral calculus    2. Acute kidney injury [facility])      Disposition referral (if applicable):  No follow-up provider specified. Disposition medications (if applicable):  New Prescriptions    No medications on file     ED Provider Disposition Time  DISPOSITION Decision To Admit 03/16/2023 09:31:51 PM      Comment: Please note this report has been produced using speech recognition software and may contain errors related to that system including errors in grammar, punctuation, and spelling, as well as words and phrases that may be inappropriate. Efforts were made to edit the dictations. Patient states she was recent diagnosed with a 5 mm kidney stone this past Sunday. Patient states she did have appointment with urologist today but unable to go due to pain. She states she got her Percocet filled last Tuesday but not help with the pain. States nausea no vomiting diarrhea. She said the pain does radiate to the right lower quadrant. She states no surgery or stents for the stones. States pain 9 out of 10 on the pain scale states she has seen some blood in urine. Denies any falls injuries trauma no back surgeries. On physical exam patient appears in uncomfortable right CVA and right side abdominal tenderness. Differential does include worsening kidney stone pyelonephritis kidney failure ureteral obstruction. Patient was ordered laboratory studies urinalysis CT abdomen pelvis. Patient elevated white blood cell count of 13.2, normal hemoglobin normal platelets. Patient normal sodium potassium calcium. Patient with acute kidney injury creatinine up to 1.4. Patient also elevated liver enzymes which are new ALT of 87, AST of 76. Patient normal lipase. Urinalysis 340 red blood cells. CT of the abdomen pelvis showed increased moderate right hydroureteronephrosis secondary to 3 mm right ureteral calculus has migrated distally to the right ureteropelvic junction. I did speak with on-call urologist Dr. Analia Christine at 9:30 PM he recommended patient be n.p.o. after midnight starting Rocephin 1 g IV and he will put her on the case for tomorrow. Rocephin 1 g IV has been ordered. Patient updated for labs and imaging study need for admission was agreeable. Hospitalist consulted will be admitting patient to their service. In the emergency department patient did get 1 L bolus normal saline IV fluids, fentanyl 50 mcg IV, Phenergan 25 mg IM, Toradol 30 mg IV. Patient states the medications did help with the pain. Clinical Impression:  1.  Hydronephrosis with urinary obstruction due to ureteral

## 2023-03-17 NOTE — BRIEF OP NOTE
Brief Postoperative Note      Patient: Eva Dominguez  YOB: 1965  MRN: [id number]    Date of Procedure: 3/17/2023    Pre-Op Diagnosis: Right distal ureteral calculus    Post-Op Diagnosis:  normal right ureter       Procedure:   Cystoscopy, right retrograde pyelogram, right ureteroscopy    Surgeon(s): Ismael Cohen MD    Assistant:  * No surgical staff found *    Anesthesia: General    Estimated Blood Loss (mL): Minimal    Complications: None    Specimens:   * No specimens in log *    Implants:  * No implants in log *      Drains: * No LDAs found *    Findings: 1. 3 mm calculus at UVJ noted on CT not visible. Inflammation at ureteral orifice suggests recent passage. Prompt drainage of contrast. No stent inserted.     Follow up: 1 month with [facility].     [id number]    Electronically signed by Ismael Cohen MD on 3/17/2023 at 3:28 PM

## 2023-03-17 NOTE — ED NOTES
ED TO INPATIENT SBAR HANDOFF    Patient Name: Sergey Rider   :  1965  62 y.o. MRN:  [id number]  Preferred Name  Charley Del Valle  ED Room #:  ED27/ED-27  Family/Caregiver Present no   Restraints no   Sitter no   Sepsis Risk Score Sepsis Risk Score: 4.39    Situation  Code Status: No Order No additional code details. Allergies: Codeine, Tramadol, and Zofran [ondansetron hcl]  Weight: No data found. Arrived from: home  Chief Complaint:   Chief Complaint   Patient presents with    Abdominal Pain     Imaging:   CT ABDOMEN PELVIS WO CONTRAST Additional Contrast? None   Final Result   Increased moderate right hydroureteronephrosis secondary to a 3 mm right   ureteral calculus that has migrated distally to the right ureteropelvic   junction.            Abnormal labs:   Abnormal Labs Reviewed   URINALYSIS - Abnormal; Notable for the following components:       Result Value    Bilirubin Urine SMALL NUMBER OR AMOUNT OBSERVED (*)     Ketones, Urine 40 (*)     Blood, Urine LARGE NUMBER OR AMOUNT OBSERVED (*)     Protein, UA 30 (*)     All other components within normal limits   CBC WITH AUTO DIFFERENTIAL - Abnormal; Notable for the following components:    WBC 13.2 (*)     RBC 5.42 (*)     Hematocrit 47.1 (*)     Segs Relative 80.3 (*)     Lymphocytes % 9.2 (*)     Monocytes % 7.5 (*)     All other components within normal limits   COMPREHENSIVE METABOLIC PANEL - Abnormal; Notable for the following components:    Creatinine 1.4 (*)     Est, Glom Filt Rate 44 (*)     Glucose 110 (*)     ALT 87 (*)     AST 76 (*)     Alkaline Phosphatase 130 (*)     All other components within normal limits   MICROSCOPIC URINALYSIS - Abnormal; Notable for the following components:    RBC,  (*)     Mucus, UA MODERATE (*)     All other components within normal limits     Critical values: yes     Abnormal Assessment Findings: kidney stone    Background  History:   Past Medical History:   Diagnosis Date    Dental abscess 2015 [facility] ED    History of cardiac monitoring 07/01/2021    Lowest HR=63. Max=124. Avg.=96/ 1.1 second episode of SVT. Artifact noted. Pneumonia 01/01/1988    Psoriasis     Spondylolysis 09/10/2012    Bilateral L5 pars defects, CT done 9-10-12 [facility]       Assessment    Vitals/MEWS: MEWS Score: 1  Level of Consciousness: Alert (0)   Vitals:    03/16/23 1644 03/16/23 2145 03/16/23 2250   BP: (!) 143/90 127/85 136/84   Pulse: 94 94 89   Resp: 22 22 16   Temp: 97.6 °F (36.4 °C) 97.6 °F (36.4 °C) 97.8 °F (36.6 °C)   TempSrc: Oral Oral Oral   SpO2: 95%  98%     FiO2 (%):   O2 Flow Rate: O2 Device: None (Room air)    Cardiac Rhythm: NSR  Pain Assessment:  [x] Verbal [] Jenene Alken Scale  Pain Scale: Pain Assessment  Pain Level: 10  Last documented pain score (0-10 scale) Pain Level: 10  Last documented pain medication administered: 2036  Mental Status: alert  NIH Score: NIH     C-SSRS: Risk of Suicide: No Risk  Bedside swallow:    [place] Coma Scale (GCS): Waqas Coma Scale  Eye Opening: Spontaneous  Best Verbal Response: Oriented  Best Motor Response: Obeys commands  [place] Coma Scale Score: 15  Active LDA's:   Peripheral IV 03/16/23 Left Antecubital (Active)     PO Status: Nothing by Mouth  Pertinent or High Risk Medications/Drips: no   If Yes, please provide details:   Pending Blood Product Administration: no     You may also review the ED PT Care Timeline found under the Summary Nursing Index tab. Recommendation    Pending orders no  Plan for Discharge (if known):    Additional Comments:    If any further questions, please call Sending RN at [phone]    Electronically signed by: Electronically signed by Livan Andrade RN on 3/16/2023 at 11:01 PM       Yusuf Salazar RN  03/16/23 [id number]       Livan Andrade RN  03/16/23 [id number]

## 2023-03-17 NOTE — ANESTHESIA PRE PROCEDURE
Department of Anesthesiology  Preprocedure Note       Name:  Jana Mcneil   Age:  62 y.o.  :  1965                                          MRN:  [id number]         Date:  3/17/2023      Surgeon: Alyssa Constantino): Angela Cleevland MD    Procedure: Procedure(s):  CYSTOSCOPY RETROGRADE PYELOGRAM STENT INSERTION POSS STONE MANIPULATION    Medications prior to admission:   Prior to Admission medications    Medication Sig Start Date End Date Taking?  Authorizing Provider   naproxen (NAPROSYN) 500 MG tablet Take 1 tablet by mouth 2 times daily 3/13/23   Emma Marshall DO   promethazine (PROMETHEGAN) 25 MG suppository Place 1 suppository rectally every 6 hours as needed for Nausea  Patient not taking: Reported on 3/17/2023 3/13/23 3/20/23  Emma Marshall DO   loratadine (CLARITIN) 10 MG tablet TAKE 1 TABLET BY MOUTH EVERY DAY 22   Christina Schwab MD   Insulin Syringe-Needle U-100 (INSULIN SYRINGE .5CC/31GX5/16\") 31G X 516\" 0.5 ML MISC 1 each by Does not apply route daily 21   Christina Schwab MD   montelukast (SINGULAIR) 10 MG tablet TAKE 1 TABLET BY MOUTH EVERY DAY  Patient not taking: No sig reported 21   Christina Schwab MD   SYRINGE-NEEDLE, DISP, 3 ML (SECURESAFE SYRINGE/NEEDLE) 23G X 1\" 3 ML MISC Use once a wk 21   Christina Schwab MD   cyanocobalamin 1000 MCG/ML injection Inject 1 mL into the muscle every 30 days 21   Christina Schwab MD   ergocalciferol (DRISDOL) 1.25 MG (82038 UT) capsule Take 1 capsule by mouth once a week 21   Christina Schwab MD   Clobetasol Propionate 0.05 % SHAM Apply 1 Dose topically daily as needed (psoriasis) 3/4/21   SHEREE Arredondo CNP   albuterol sulfate  (90 Base) MCG/ACT inhaler INHALE 2 PUFFS INTO THE LUNGS 4 TIMES DAILY AS NEEDED FOR WHEEZING 20   SHEREE Arredondo CNP   azelastine (ASTELIN) 0.1 % nasal spray 1 spray by Nasal route 2 times daily Use in each nostril as directed 20   SHEREE Arredondo - ROSE MARY   fluticasone (FLONASE) 50 MCG/ACT nasal spray 2 sprays by Each Nostril route daily 4/3/20   Remi Cardenas DO   Naproxen Sodium 220 MG CAPS Take 1 tablet by mouth as needed for Pain. Historical Provider, MD   Multiple Vitamin (MULTIVITAMIN PO) Take 1 tablet by mouth daily.  2/15/11   Historical Provider, MD       Current medications:    Current Facility-Administered Medications   Medication Dose Route Frequency Provider Last Rate Last Admin    albuterol sulfate HFA (PROVENTIL;VENTOLIN;PROAIR) 108 (90 Base) MCG/ACT inhaler 2 puff  2 puff Inhalation Q4H PRN Shara Goodpasture, MD        azelastine (ASTELIN) 0.1 % nasal spray 1 spray  1 spray Each Nostril BID PRN Shara Goodpasture, MD        Clobetasol Propionate 0.05 % SHAM 1 Dose (Patient Supplied)  1 Dose Apply externally Daily PRN Shara Goodpasture, MD        cyanocobalamin injection 1,000 mcg  1,000 mcg IntraMUSCular Q30 Days Shara Goodpasture, MD   1,000 mcg at 03/17/23 0930    vitamin D (ERGOCALCIFEROL) capsule 50,000 Units  50,000 Units Oral Weekly Shara Goodpasture, MD        fluticasone [facility]) 50 MCG/ACT nasal spray 2 spray  2 spray Each Nostril Daily Shara Goodpasture, MD        cetirizine (ZYRTEC) tablet 10 mg  10 mg Oral Daily PRN Shara Goodpasture, MD        therapeutic multivitamin-minerals 1 tablet  1 tablet Oral Daily Shara Goodpasture, MD        sodium chloride flush 0.9 % injection 5-40 mL  5-40 mL IntraVENous 2 times per day Shara Goodpasture, MD        sodium chloride flush 0.9 % injection 5-40 mL  5-40 mL IntraVENous PRN Shara Goodpasture, MD        0.9 % sodium chloride infusion   IntraVENous PRN Shara Goodpasture, MD        enoxaparin (LOVENOX) injection 40 mg  40 mg SubCUTAneous Daily Shara Goodpasture, MD   40 mg at 03/17/23 0930    polyethylene glycol (GLYCOLAX) packet 17 g  17 g Oral Daily PRN Shara Goodpasture, MD        acetaminophen (TYLENOL) tablet 650 mg  650 mg Oral Q6H PRN Shara Goodpasture, MD        Or    acetaminophen (TYLENOL) suppository 650 mg  650 mg Rectal Q6H PRN Shara Goodpasture, MD        nicotine (NICODERM CQ) 14 MG/24HR 1 patch  1 patch TransDERmal Daily Leda Sandhoff, MD   1 patch at 03/17/23 0930    potassium chloride (KLOR-CON M) extended release tablet 40 mEq  40 mEq Oral PRN Leda Sandhoff, MD        Or    potassium bicarb-citric acid (EFFER-K) effervescent tablet 40 mEq  40 mEq Oral PRN Leda Sandhoff, MD        Or    potassium chloride 10 mEq/100 mL IVPB (Peripheral Line)  10 mEq IntraVENous PRN Leda Sandhoff, MD        magnesium sulfate 2000 mg in 50 mL IVPB premix  2,000 mg IntraVENous PRN Leda Sandhoff, MD        aluminum & magnesium hydroxide-simethicone (MAALOX) 200-200-20 MG/5ML suspension 30 mL  30 mL Oral Q6H PRN Leda Sandhoff, MD        oxyCODONE (ROXICODONE) immediate release tablet 5 mg  5 mg Oral Q4H PRN Leda Sandhoff, MD        Or   Clarence Valdez oxyCODONE HCl (OXY-IR) immediate release tablet 10 mg  10 mg Oral Q4H PRN Leda Sandhoff, MD        lactated ringers IV soln infusion   IntraVENous Continuous Leda Sandhoff,  mL/hr at 03/17/23 0118 New Bag at 03/17/23 0118    cefTRIAXone (ROCEPHIN) 1,000 mg in sodium chloride 0.9 % 50 mL IVPB (mini-bag)  1,000 mg IntraVENous Q24H Leda Sandhoff, MD           Allergies: Allergies   Allergen Reactions    Codeine      Stomach cramps    Tramadol     Zofran [Ondansetron Hcl] Other (See Comments)     Severe abd pain       Problem List:    Patient Active Problem List   Diagnosis Code    Tobacco abuse Z72.0    Dry skin dermatitis L85.3    Cold sore B00.1    Mixed hyperlipidemia E78.2    Fatigue R53.83    Vitamin D deficiency E55.9    B12 deficiency E53.8    Positive depression screening Z13.31    Anxiety and depression F41.9, F32. A    JOSÉ MIGUEL positive R76.8    Nephrolithiasis N20.0       Past Medical History:        Diagnosis Date    Dental abscess 01/01/2015    [facility] ED    History of cardiac monitoring 07/01/2021    Lowest HR=63. Max=124. Avg.=96/ 1.1 second episode of SVT. Artifact noted.     Pneumonia 01/01/1988    Psoriasis     Spondylolysis 09/10/2012    Bilateral L5 pars defects, CT done 9-10-12 [facility]       Past Surgical History:        Procedure Laterality Date    CHOLECYSTECTOMY  1988    gall stones    ECTOPIC PREGNANCY SURGERY  1996    HYSTERECTOMY (CERVIX STATUS UNKNOWN)  2004    benign tumor right ovary    HYSTERECTOMY, VAGINAL      TUBAL LIGATION  1993       Social History:    Social History     Tobacco Use    Smoking status: Every Day     Packs/day: 1.50     Years: 25.00     Pack years: 37.50     Types: Cigarettes     Start date: 12/19/1980    Smokeless tobacco: Never   Substance Use Topics    Alcohol use: Yes     Comment: occassionally                                Ready to quit: Not Answered  Counseling given: Not Answered      Vital Signs (Current):   Vitals:    03/16/23 2145 03/16/23 2250 03/17/23 0035 03/17/23 0900   BP: 127/85 136/84 114/80 108/68   Pulse: 94 89 80    Resp: 22 16 18    Temp: 36.4 °C (97.6 °F) 36.6 °C (97.8 °F) 37.4 °C (99.3 °F) 36.8 °C (98.2 °F)   TempSrc: Oral Oral Oral Oral   SpO2:  98% 98% 98%   Weight:   165 lb 12.6 oz (75.2 kg)    Height:   5' 7\" (1.702 m)                                               BP Readings from Last 3 Encounters:   03/17/23 108/68   03/12/23 135/88   12/08/21 118/82       NPO Status:                                                                                 BMI:   Wt Readings from Last 3 Encounters:   03/17/23 165 lb 12.6 oz (75.2 kg)   12/08/21 164 lb (74.4 kg)   09/14/21 165 lb 3.2 oz (74.9 kg)     Body mass index is 25.97 kg/m².     CBC:   Lab Results   Component Value Date/Time    WBC 9.1 03/17/2023 10:08 AM    RBC 4.64 03/17/2023 10:08 AM    HGB 13.3 03/17/2023 10:08 AM    HCT 41.4 03/17/2023 10:08 AM    MCV 89.2 03/17/2023 10:08 AM    RDW 13.9 03/17/2023 10:08 AM     03/17/2023 10:08 AM       CMP:   Lab Results   Component Value Date/Time     03/17/2023 10:08 AM    K 4.5 03/17/2023 10:08 AM     03/17/2023 10:08 AM    CO2 25 03/17/2023 10:08 AM    BUN 19 03/17/2023 10:08 AM    CREATININE 0.8 03/17/2023 10:08 AM    GFRAA >60 11/02/2021 01:00 PM    LABGLOM >60 03/17/2023 10:08 AM    GLUCOSE 81 03/17/2023 10:08 AM    PROT 5.5 03/17/2023 10:08 AM    PROT 6.9 07/21/2011 10:00 AM    CALCIUM 8.9 03/17/2023 10:08 AM    BILITOT 0.3 03/17/2023 10:08 AM    ALKPHOS 107 03/17/2023 10:08 AM    AST 36 03/17/2023 10:08 AM    ALT 58 03/17/2023 10:08 AM       POC Tests: No results for input(s): POCGLU, POCNA, POCK, POCCL, POCBUN, POCHEMO, POCHCT in the last 72 hours. Coags: No results found for: PROTIME, INR, APTT    HCG (If Applicable): No results found for: PREGTESTUR, PREGSERUM, HCG, HCGQUANT     ABGs: No results found for: PHART, PO2ART, LNX6SYK, IHZ8OVJ, BEART, G0CIHMCN     Type & Screen (If Applicable):  No results found for: LABABO, LABRH    Drug/Infectious Status (If Applicable):  Lab Results   Component Value Date/Time    HEPCAB NON REACTIVE 03/17/2023 10:08 AM       COVID-19 Screening (If Applicable):   Lab Results   Component Value Date/Time    COVID19 NOT DETECTED 03/24/2021 12:00 PM           Anesthesia Evaluation  Patient summary reviewed  Airway: Mallampati: I  TM distance: >3 FB   Neck ROM: full  Mouth opening: > = 3 FB   Dental:          Pulmonary:normal exam    (+) pneumonia: resolved,  current smoker          Patient did not smoke on day of surgery. Cardiovascular:  Exercise tolerance: good (>4 METS),   (+) dysrhythmias: SVT,                   Neuro/Psych:   (+) depression/anxiety              ROS comment: etoh  GI/Hepatic/Renal:   (+) renal disease: kidney stones,           Endo/Other:    (+) DiabetesType I DM, using insulin, : arthritis:., .                 Abdominal:             Vascular: Other Findings:           Anesthesia Plan      general     ASA 2     (Add-on pre-reviewed 3/17/23)  Induction: intravenous. MIPS: Postoperative opioids intended and Prophylactic antiemetics administered.   Anesthetic plan and risks discussed with patient.                        Ana Butts, SHEREE - CRNA   3/17/2023

## 2023-03-17 NOTE — ANESTHESIA POSTPROCEDURE EVALUATION
Department of Anesthesiology  Postprocedure Note    Patient: Jana Graham  MRN: [id number]  YOB: 1965  Date of evaluation: 3/17/2023      Procedure Summary     Date: 03/17/23 Room / Location: [address]    Anesthesia Start: 1538 Anesthesia Stop: 1611    Procedure: CYSTOSCOPY URETEROSCOPY RETROGRADE PYELOGRAM (Right: Ureter) Diagnosis:       Infection      (Infection [B99.9])    Surgeons: Vidya Cowan MD Responsible Provider: Lily Cam MD    Anesthesia Type: general ASA Status: 2          Anesthesia Type: No value filed.     Silva Phase I: Silva Score: 3    Silva Phase II:        Anesthesia Post Evaluation    Patient location during evaluation: PACU  Patient participation: complete - patient participated  Level of consciousness: awake and alert  Pain score: 1  Airway patency: patent  Nausea & Vomiting: no nausea and no vomiting  Complications: no  Cardiovascular status: blood pressure returned to baseline and hemodynamically stable  Respiratory status: acceptable, face mask, nonlabored ventilation and spontaneous ventilation  Hydration status: euvolemic  Multimodal analgesia pain management approach

## 2023-03-17 NOTE — PROGRESS NOTES
V2.0  [facility] [place]      Name:  Ector Freeman /Age/Sex: 1965  (62 y.o. female)   MRN & CSN:  [id number] & [id number] Encounter Date/Time: 3/17/2023 8:15 AM EDT    Location:  7317/0266-R PCP: Suzie Cotter MD       Hospital Day: 2    Assessment and Plan:   Ector Freeman is a 62 y.o. female who presents with right flank pain for 5 days. Pt recently evaluated in ED for nephrolithiasis and had recurrent symptoms and right flank pain radiating down to right groin, crampy and intermittent. VSS. Leukocytosis of 13, Cr 1.4 (baseline wnl), UA with RBCs and ketones, increased specific gravity. CT abd/pelvis with increased mod right hydroureteronephrosis 2/2 3 mm right ureteral calculus that has migrated distally to the right UPJ. Pt given bolus, CTX, pain control then admitted for further management. Plan:    3 mm right ureteropelvic junction renal calculus  -Maintain IVF  -Continue rocephin  -F/u Ucx  -Analgesics and antiemetics  -Urology following  -NPO for possible procedure    HANNAH -2/2 above  -IVF hydration and treatment of above  -Bladder scan  -Monitor labs daily    Transaminitis  CT abd/pelvis with unremarkable liver, GB and no ductal dilation.  -Repeat labs    Allergies -continue home meds      Diet Diet NPO   DVT Prophylaxis [x] Lovenox, []  Heparin, [] SCDs, [] Ambulation,  [] Eliquis, [] Xarelto  [] Coumadin   Code Status Full Code   Disposition From: Home  Expected Disposition: Home  Estimated Date of Discharge: 1-2 days  Patient requires continued admission due to nephrolithiasis management   Surrogate Decision Maker/ POA      Subjective:     Chief Complaint: Abdominal Pain     Pain well controlled at present, no n/v or LE edema. Reports she is urinating but has not gone overnight because she was sleeping. Pt reports this is her 3rd episode of kidney stones, she has passed the prior two herself. Does not drink alcohol but does smoke 1 PPD.       Review of Systems:    Review of Systems    Negative except as above. Objective: Intake/Output Summary (Last 24 hours) at 3/17/2023 0815  Last data filed at 3/17/2023 0130  Gross per 24 hour   Intake --   Output 600 ml   Net -600 ml        Vitals:   Vitals:    03/17/23 0035   BP: 114/80   Pulse: 80   Resp: 18   Temp: 99.3 °F (37.4 °C)   SpO2: 98%       Physical Exam:     General: NAD  Eyes: EOMI  ENT: neck supple  Cardiovascular: Regular rate. Respiratory: Clear to auscultation  Gastrointestinal: Soft, non tender  Genitourinary: no suprapubic tenderness  Musculoskeletal: No edema. Right CVA tenderness present  Skin: warm, dry  Neuro: Alert. Psych: Mood appropriate.      Medications:   Medications:    cyanocobalamin  1,000 mcg IntraMUSCular Q30 Days    vitamin D  50,000 Units Oral Weekly    fluticasone  2 spray Each Nostril Daily    therapeutic multivitamin-minerals  1 tablet Oral Daily    sodium chloride flush  5-40 mL IntraVENous 2 times per day    enoxaparin  40 mg SubCUTAneous Daily    nicotine  1 patch TransDERmal Daily    cefTRIAXone (ROCEPHIN) IV  1,000 mg IntraVENous Q24H      Infusions:    sodium chloride      lactated ringers IV soln 100 mL/hr at 03/17/23 0118     PRN Meds: albuterol sulfate HFA, 2 puff, Q4H PRN  azelastine, 1 spray, BID PRN  Clobetasol Propionate, 1 Dose, Daily PRN  cetirizine, 10 mg, Daily PRN  sodium chloride flush, 5-40 mL, PRN  sodium chloride, , PRN  polyethylene glycol, 17 g, Daily PRN  acetaminophen, 650 mg, Q6H PRN   Or  acetaminophen, 650 mg, Q6H PRN  potassium chloride, 40 mEq, PRN   Or  potassium alternative oral replacement, 40 mEq, PRN   Or  potassium chloride, 10 mEq, PRN  magnesium sulfate, 2,000 mg, PRN  aluminum & magnesium hydroxide-simethicone, 30 mL, Q6H PRN  oxyCODONE, 5 mg, Q4H PRN   Or  oxyCODONE, 10 mg, Q4H PRN        Labs      Recent Results (from the past 24 hour(s))   Urinalysis    Collection Time: 03/16/23  5:24 PM   Result Value Ref Range    Color, UA YELLOW YELLOW    Clarity, UA CLEAR CLEAR    Glucose, Urine NEGATIVE NEGATIVE MG/DL    Bilirubin Urine SMALL NUMBER OR AMOUNT OBSERVED (A) NEGATIVE MG/DL    Ketones, Urine 40 (A) NEGATIVE MG/DL    Specific Gravity, UA >1.030 1.001 - 1.035    Blood, Urine LARGE NUMBER OR AMOUNT OBSERVED (A) NEGATIVE    pH, Urine 5.5 5.0 - 8.0    Protein, UA 30 (A) NEGATIVE MG/DL    Urobilinogen, Urine 0.2 0.2 - 1.0 MG/DL    Nitrite Urine, Quantitative NEGATIVE NEGATIVE    Leukocyte Esterase, Urine NEGATIVE NEGATIVE   Microscopic Urinalysis    Collection Time: 03/16/23  5:24 PM   Result Value Ref Range    RBC,  (H) 0 - 6 /HPF    WBC, UA NONE SEEN 0 - 5 /HPF    Bacteria, UA NEGATIVE NEGATIVE /HPF    Squam Epithel, UA 5 /HPF    Mucus, UA MODERATE (A) NEGATIVE HPF    Trichomonas, UA NONE SEEN NONE SEEN /HPF    Ca Oxalate Gala, UA OCCASIONAL /HPF   CBC with Auto Differential    Collection Time: 03/16/23  5:30 PM   Result Value Ref Range    WBC 13.2 (H) 4.0 - 10.5 K/CU MM    RBC 5.42 (H) 4.2 - 5.4 M/CU MM    Hemoglobin 15.6 12.5 - 16.0 GM/DL    Hematocrit 47.1 (H) 37 - 47 %    MCV 86.9 78 - 100 FL    MCH 28.8 27 - 31 PG    MCHC 33.1 32.0 - 36.0 %    RDW 13.7 11.7 - 14.9 %    Platelets 504 907 - 863 K/CU MM    MPV 10.6 7.5 - 11.1 FL    Differential Type AUTOMATED DIFFERENTIAL     Segs Relative 80.3 (H) 36 - 66 %    Lymphocytes % 9.2 (L) 24 - 44 %    Monocytes % 7.5 (H) 0 - 4 %    Eosinophils % 2.4 0 - 3 %    Basophils % 0.4 0 - 1 %    Segs Absolute 10.6 K/CU MM    Lymphocytes Absolute 1.2 K/CU MM    Monocytes Absolute 1.0 K/CU MM    Eosinophils Absolute 0.3 K/CU MM    Basophils Absolute 0.1 K/CU MM    Nucleated RBC % 0.0 %    Total Nucleated RBC 0.0 K/CU MM    Total Immature Neutrophil 0.03 K/CU MM    Immature Neutrophil % 0.2 0 - 0.43 %   Comprehensive Metabolic Panel    Collection Time: 03/16/23  5:30 PM   Result Value Ref Range    Sodium 135 135 - 145 MMOL/L    Potassium 4.4 3.5 - 5.1 MMOL/L    Chloride 101 99 - 110 mMol/L    CO2 23 21 - 32 MMOL/L    BUN 20 6 - 23 MG/DL    Creatinine 1.4 (H) 0.6 - 1.1 MG/DL    Est, Glom Filt Rate 44 (L) >60 mL/min/1.73m2    Glucose 110 (H) 70 - 99 MG/DL    Calcium 10.1 8.3 - 10.6 MG/DL    Albumin 4.2 3.4 - 5.0 GM/DL    Total Protein 7.2 6.4 - 8.2 GM/DL    Total Bilirubin 0.6 0.0 - 1.0 MG/DL    ALT 87 (H) 10 - 40 U/L    AST 76 (H) 15 - 37 IU/L    Alkaline Phosphatase 130 (H) 40 - 129 IU/L    Anion Gap 11 4 - 16   Lipase    Collection Time: 03/16/23  5:30 PM   Result Value Ref Range    Lipase 16 13 - 60 IU/L   Electrolytes urine random    Collection Time: 03/17/23  1:30 AM   Result Value Ref Range    Sodium, Ur 118 35 - 167 MMOL/L    Potassium, Ur 60.9 22 - 119 MMOL/L    Chloride 127 43 - 210 MMOL/L   Creatinine, urine, random    Collection Time: 03/17/23  1:30 AM   Result Value Ref Range    Creatinine, Ur 164.6 28 - 217 MG/DL        Imaging/Diagnostics Last 24 Hours   CT ABDOMEN PELVIS WO CONTRAST Additional Contrast? None    Result Date: 3/16/2023  EXAMINATION: CT OF THE ABDOMEN AND PELVIS WITHOUT CONTRAST 3/16/2023 7:40 pm TECHNIQUE: CT of the abdomen and pelvis was performed without the administration of intravenous contrast. Multiplanar reformatted images are provided for review. Automated exposure control, iterative reconstruction, and/or weight based adjustment of the mA/kV was utilized to reduce the radiation dose to as low as reasonably achievable. COMPARISON: 03/12/2023 HISTORY: ORDERING SYSTEM PROVIDED HISTORY: kidney stones, dysuria rule out stones TECHNOLOGIST PROVIDED HISTORY: Reason for exam:->kidney stones, dysuria rule out stones Additional Contrast?->None Decision Support Exception - unselect if not a suspected or confirmed emergency medical condition->Emergency Medical Condition (MA) Reason for Exam: kidney stones, dysuria FINDINGS: Lower Chest: No acute abnormality in the visualized lower thorax. Chronic calcified granulomatous changes in the right lower lobe.  Organs: Limited evaluation due lack of intravenous contrast. Prior cholecystectomy. No biliary ductal dilatation. Liver, pancreas, spleen, adrenal glands, and left kidney are unremarkable. There is no left hydronephrosis or left renal calculus. Increased moderate right hydronephrosis proximal to a 3 mm calculus that has migrated distally to the right ureterovesical junction. GI/Bowel: No acute bowel abnormality. No evidence of appendicitis. Pelvis: Urinary bladder and pelvic organs are unremarkable. Peritoneum/Retroperitoneum: There is no intraperitoneal free air or free fluid. There is no pathologically enlarged or morphologically abnormal lymph node. The abdominal aorta is normal in caliber with mild calcifications. Bones/Soft Tissues: No acute osseous or soft tissue abnormality is seen. There is unchanged grade 1 anterolisthesis of L5 on S1 secondary to bilateral L5 spondylolysis. Increased moderate right hydroureteronephrosis secondary to a 3 mm right ureteral calculus that has migrated distally to the right ureteropelvic junction.        Electronically signed by Sanjuana Sin MD on 3/17/2023 at 8:15 AM

## 2023-03-17 NOTE — PROGRESS NOTES
03/17/23 1455   Encounter Summary   Encounter Overview/Reason  Attempted Encounter   Service Provided For: Patient not available   Last Encounter  03/17/23  (patient was not in the room)   Begin Time 1455   Assessment/Intervention/Outcome   Assessment Unable to assess   Plan and Referrals   Plan/Referrals Continue to visit, (comment)  (as needed)

## 2023-03-17 NOTE — PROGRESS NOTES
4 Eyes Skin Assessment     NAME:  Augusta Allan  YOB: 1965  MEDICAL RECORD NUMBER:  [id number]    The patient is being assessed for  Admission    I agree that One RN has performed a thorough Head to Toe Skin Assessment on the patient. ALL assessment sites listed below have been assessed. Areas assessed by both nurses:    Head, Face, Ears, Shoulders, Back, Chest, Arms, Elbows, Hands, Sacrum. Buttock, Coccyx, Ischium, and Legs. Feet and Heels        Does the Patient have a Wound?  No noted wound(s)       Agus Prevention initiated by RN: No   Wound Care Orders initiated by RN: No    Pressure Injury (Stage 3,4, Unstageable, DTI, NWPT, and Complex wounds) if present, place referral order by RN under : No    New and Established Ostomies, if present place, referral order under : No      Nurse 1 eSignature: Electronically signed by Yasmin Castro RN on 3/17/23 at 2:19 AM EDT    **SHARE this note so that the co-signing nurse can place an eSignature**    Nurse 2 eSignature: Electronically signed by Ant Vazquez LPN on 1/39/37 at 9:56 AM EDT

## 2023-03-17 NOTE — CONSULTS
Patient:   Lissy Bean    Date:  23  :  1965, 62 y.o. Nephrologist: Destiny Manzanares MD  Provider: Fredrick Aceves MD    Reason for Consult:   acute kidney injury    Consult requested by : Dr Jana Parker MD    Chief Complaint:    right flank pain    HISTORY OF PRESENT ILLNESS/Brief hospital course  AND  brief background history     Ms. Danis Palomo, is a 58-year young female, who was brought to the emergency department in [facility] by her friend with worsening of right flank pain. She had similar symptoms but less in intensity on  which made her to go to [place] emergency department. At that time she had a CT of the abdomen and pelvis without administration of intravenous contrast were done and showed mild to moderate right-sided hydronephrosis with 5 mm proximal right ureteral stone. She was discharged home with appropriate therapy with close urology outpatient follow-up. But unfortunately as her symptoms worsen she decided to come to [facility] emergency department. On arrival in our emergency department here in [facility], she was afebrile and blood pressure was acceptable, her oxygen saturation is 95% while breathing ambient air. She had another repeat CAT scan which showed no increase right hydro ureteral nephrosis with remainder of right ureteral stone, and apparently has migrated distally to the right ureteropelvic junction. Biochemical testing showed increased creatinine of 1.4 from her baseline of 0.8 just 3 days ago. Other pertinent abnormal lab include high white count of 13.2 thousand, lymphopenia surprisingly 9.2%. she was given 1 L of crystalloid solution, mainly normal saline. Appropriate  bodily fluid culture were drawn, and empirical antimicrobial agent mainly ceftriaxone initiated. She was given fentanyl, and 1 dose of ketorolac for pain. Also lactated Ringer at 100 mill an hour were initiated.   She was subsequently admitted to medical floor for further evaluation. When I saw her this morning, she still has some pain but otherwise alert awake and oriented and able to eat and drink. She does not have any nausea vomiting or diarrhea. Creatinine trend/ Kidney data :    Heart/ cardiac data :     Kidney imaging :     PMH :     She had at least 2 episode of kidney stone before apparently she spontaneously passed   allergic rhinitis   also had episode of COVID-19 back in  without requiring hospitalization        PSH :   gallbladder surgery   tubal ligation   she had an ectopic pregnancy  necessitating surgical intervention in the past- her ovary was removed eventually she underwent hysterectomy later on      OB GYN Hx:    4 para 3, 1 ectopic pregnancy, no history of eclampsia, preeclampsia, gestational diabetes or hypertension    Habits :    unfortunately she smokes pack per day and has been doing it since age 13, she is a social alcohol consumer, no history of illicit drug abuse    Soc Hx:   she was born in [facility] but currently living in [place], she is not  and her son lives with her. She has 3 children. Although she used to work for [employer] but currently not working.     [address]     No significant medical history in the family , no history of kidney stone or chronic kidney disease        REVIEW OF SYSTEMS:     All pertinent ROS neg except    right flank pain    Medication :     Reviewed, see in epic    /80   Pulse 80   Temp 99.3 °F (37.4 °C) (Oral)   Resp 18   Ht 5' 7\" (1.702 m)   Wt 165 lb 12.6 oz (75.2 kg)   SpO2 98%   BMI 25.97 kg/m²     PHYSICAL EXAM:  General appearance:  alert, awake and oriented  HEENT:  no conjunctival pallor or scleral icterus  Neck:  supple  Heart:  regular rate and rhythm  LUNGS:  no crackles  Abdomen:  soft, right flank pain  Extremities:  no gross edema    LABS:  Reviewed, see in epic            IMPRESSION:   acute kidney injury- very well could be from moderate hydronephrosis- along with inflammatory response unless she also had additional infection   recurrent kidney stone may require biochemical testing and  find out theetiology and type of stone   long history of smoking otherwise not much of chronic disease    PLAN:     we will start with UA and urinary indicis- if she can eat and drink which she is doing now discontinue lactated Ringer- unless she is going to the operating room for stone removal and perhaps stent placement- I had good discussion with her about abstaining from smoking- hopefully we can achieve with- we could try nicotine patch for now- supplemented with pharmacological therapy if necessary- also of course look at the composition of stone- and will do some biochemical testing as an outpatient but add PTH level for tomorrow morning- we talked about appropriate diet lifestyle which include low-salt, DASH diet- drinking 2 to 3 L of water- stop all beverage etc.- follow clinically

## 2023-03-17 NOTE — CONSULTS
[facility] [address]   Consult Note  [facility] 1 2 3 4 5    Date: 3/17/2023   Patient: Angelito Barnes   : 1965   DOA: 3/16/2023   MRN: [id number]   ROOM#: [id number]     Reason for Consult: nephrolithiasis/ HANNAH  Requesting Physician: Dr. Rogerio Alvarenga  Collaborating Urologist on Call at time of admission: Dr. Alley Thurman: right flank pain    History Obtained From:  patient, electronic medical record    HISTORY OF PRESENT ILLNESS:                The patient is a 62 y.o. female with significant past medical history of renal calculi who presented to the ER on 2023 with right flank pain. She states she was to have an appointment in our office yesterday for follow up of renal calculi from CT scan of abdomen and pelvis on 2023 for a 5mm stone in the right proximal ureter. She was unable to make the appointment due the the right flank pain-uncontrolled as well as nausea with no vomiting. Her PMH includes dental abscess, cardiac monitoring for SVT, pneumonia, and spondylosis. This AM on examination she is resting comfortably in bed with her right flank pain under good control with current pain medicine. She denies nausea, vomiting, fever, chills. She has not visualized passing the stone as of this morning. Currently straining all urine. Voiding well, moderate urgency, gross hematuria yesterday-has resolved. Plan for surgical intervention today if she does not pass the stone on her own. Cystoscopy, possible laser lithotripsy, right ureteroscopy with stone manipulation, right ureteral stent placement in the operating room today. Risks and benefits of surgery discussed with the patient and she is agreeable to proceed with surgery if she does not pass the stone. IM Provider's HPI 2023: Angelito Barnes is a 62 y.o. female with pmh of allergies who presents with right flank pain since .   Patient was recently seen in the ED for right flank pain and found to have nephrolithiasis, upon discharge patient had  recurrent symptoms with right flank pain radiating down to the right groin, crampy and intermittent. Review of system otherwise unremarkable. Vital signs in the ED stable, labs with leukocytosis 13.2k, BUN 20/creatinine 1.4, ALT 87/AST 76/, urinalysis with 40 ketones, 340 RBC. CT A/P with Increased moderate right hydroureteronephrosis secondary to a 3 mm right ureteral calculus that has migrated distally to the right ureteropelvic junction. Case was discussed with urology as per ED and patient was given 1 L normal saline bolus, ceftriaxone, fentanyl, ketorolac and promethazine and is currently being evaluated for possible urological procedure in the a.m. Past Medical History:        Diagnosis Date    Dental abscess 01/01/2015    [facility] ED    History of cardiac monitoring 07/01/2021    Lowest HR=63. Max=124. Avg.=96/ 1.1 second episode of SVT. Artifact noted.     Pneumonia 01/01/1988    Psoriasis     Spondylolysis 09/10/2012    Bilateral L5 pars defects, CT done 9-10-12 [facility]     Past Surgical History:        Procedure Laterality Date    CHOLECYSTECTOMY  1988    gall stones    ECTOPIC PREGNANCY SURGERY  1996    HYSTERECTOMY (CERVIX STATUS UNKNOWN)  2004    benign tumor right ovary    HYSTERECTOMY, VAGINAL      TUBAL LIGATION  1993     Current Medications:   Current Facility-Administered Medications: albuterol sulfate HFA (PROVENTIL;VENTOLIN;PROAIR) 108 (90 Base) MCG/ACT inhaler 2 puff, 2 puff, Inhalation, Q4H PRN  azelastine (ASTELIN) 0.1 % nasal spray 1 spray, 1 spray, Each Nostril, BID PRN  Clobetasol Propionate 0.05 % SHAM 1 Dose (Patient Supplied), 1 Dose, Apply externally, Daily PRN  cyanocobalamin injection 1,000 mcg, 1,000 mcg, IntraMUSCular, Q30 Days  vitamin D (ERGOCALCIFEROL) capsule 50,000 Units, 50,000 Units, Oral, Weekly  fluticasone (FLONASE) 50 MCG/ACT nasal spray 2 spray, 2 spray, Each Nostril, Daily  cetirizine (ZYRTEC) tablet 10 mg, 10 mg, Oral, Daily PRN  therapeutic multivitamin-minerals 1 tablet, 1 tablet, Oral, Daily  sodium chloride flush 0.9 % injection 5-40 mL, 5-40 mL, IntraVENous, 2 times per day  sodium chloride flush 0.9 % injection 5-40 mL, 5-40 mL, IntraVENous, PRN  0.9 % sodium chloride infusion, , IntraVENous, PRN  enoxaparin (LOVENOX) injection 40 mg, 40 mg, SubCUTAneous, Daily  polyethylene glycol (GLYCOLAX) packet 17 g, 17 g, Oral, Daily PRN  acetaminophen (TYLENOL) tablet 650 mg, 650 mg, Oral, Q6H PRN **OR** acetaminophen (TYLENOL) suppository 650 mg, 650 mg, Rectal, Q6H PRN  nicotine (NICODERM CQ) 14 MG/24HR 1 patch, 1 patch, TransDERmal, Daily  potassium chloride (KLOR-CON M) extended release tablet 40 mEq, 40 mEq, Oral, PRN **OR** potassium bicarb-citric acid (EFFER-K) effervescent tablet 40 mEq, 40 mEq, Oral, PRN **OR** potassium chloride 10 mEq/100 mL IVPB (Peripheral Line), 10 mEq, IntraVENous, PRN  magnesium sulfate 2000 mg in 50 mL IVPB premix, 2,000 mg, IntraVENous, PRN  aluminum & magnesium hydroxide-simethicone (MAALOX) 200-200-20 MG/5ML suspension 30 mL, 30 mL, Oral, Q6H PRN  oxyCODONE (ROXICODONE) immediate release tablet 5 mg, 5 mg, Oral, Q4H PRN **OR** oxyCODONE HCl (OXY-IR) immediate release tablet 10 mg, 10 mg, Oral, Q4H PRN  lactated ringers IV soln infusion, , IntraVENous, Continuous  cefTRIAXone (ROCEPHIN) 1,000 mg in sodium chloride 0.9 % 50 mL IVPB (mini-bag), 1,000 mg, IntraVENous, Q24H    Allergies:  Codeine, Tramadol, and Zofran [ondansetron hcl]    Social History:   TOBACCO:   reports that she has been smoking. She started smoking about 42 years ago. She has a 37.50 pack-year smoking history. She has never used smokeless tobacco.  ETOH:   reports current alcohol use. DRUGS:   reports no history of drug use.     Family History:       Problem Relation Age of Onset    Cancer Mother     Diabetes Mother     High Blood Pressure Mother     Obesity Mother     High Cholesterol Mother     Allergies Father     Diabetes Son     Depression Son     Stroke Maternal Grandfather        REVIEW OF SYSTEMS:     CONSTITUTIONAL:  negative  RESPIRATORY:  negative  CARDIOVASCULAR:  negative  GASTROINTESTINAL:  negative  GENITOURINARY:  positive for frequency and hematuria    PHYSICAL EXAM:      VITALS:  /80   Pulse 80   Temp 99.3 °F (37.4 °C) (Oral)   Resp 18   Ht 5' 7\" (1.702 m)   Wt 165 lb 12.6 oz (75.2 kg)   SpO2 98%   BMI 25.97 kg/m²      TEMPERATURE:  Current - Temp: 99.3 °F (37.4 °C); Max - Temp  Av.1 °F (36.7 °C)  Min: 97.6 °F (36.4 °C)  Max: 99.3 °F (37.4 °C)  24HR BLOOD PRESSURE RANGE:  Systolic (10DSK), CSD:031 , Min:114 , HM   ; Diastolic (07JAZ), JHP:10, Min:80, Max:90    8HR INTAKE OUTPUT:  No intake/output data recorded. URINARY CATHETER OUTPUT ([place]):     DRAIN/TUBE OUTPUT:        Physical Exam:  General appearance: alert, appears stated age, cooperative, and no distress  Head: Normocephalic, without obvious abnormality, atraumatic  Back: symmetric, no curvature. ROM normal. No CVA tenderness.   Abdomen: soft, non-tender; bowel sounds normal; no masses,  no organomegaly    DATA:    WBC:    Lab Results   Component Value Date/Time    WBC 13.2 2023 05:30 PM     Hemoglobin/Hematocrit:    Lab Results   Component Value Date/Time    HGB 15.6 2023 05:30 PM    HCT 47.1 2023 05:30 PM     BMP:    Lab Results   Component Value Date/Time     2023 05:30 PM    K 4.4 2023 05:30 PM     2023 05:30 PM    CO2 23 2023 05:30 PM    BUN 20 2023 05:30 PM    LABALBU 4.2 2023 05:30 PM    CREATININE 1.4 2023 05:30 PM    CALCIUM 10.1 2023 05:30 PM    GFRAA >60 2021 01:00 PM    LABGLOM 44 2023 05:30 PM       Imaging:  CT ABDOMEN PELVIS WO CONTRAST Additional Contrast? None    Result Date: 3/16/2023  EXAMINATION: CT OF THE ABDOMEN AND PELVIS WITHOUT CONTRAST 3/16/2023 7:40 pm TECHNIQUE: CT of the abdomen and pelvis was performed without the administration of intravenous contrast. Multiplanar reformatted images are provided for review. Automated exposure control, iterative reconstruction, and/or weight based adjustment of the mA/kV was utilized to reduce the radiation dose to as low as reasonably achievable. COMPARISON: 03/12/2023 HISTORY: ORDERING SYSTEM PROVIDED HISTORY: kidney stones, dysuria rule out stones TECHNOLOGIST PROVIDED HISTORY: Reason for exam:->kidney stones, dysuria rule out stones Additional Contrast?->None Decision Support Exception - unselect if not a suspected or confirmed emergency medical condition->Emergency Medical Condition (MA) Reason for Exam: kidney stones, dysuria FINDINGS: Lower Chest: No acute abnormality in the visualized lower thorax. Chronic calcified granulomatous changes in the right lower lobe. Organs: Limited evaluation due lack of intravenous contrast.  Prior cholecystectomy. No biliary ductal dilatation. Liver, pancreas, spleen, adrenal glands, and left kidney are unremarkable. There is no left hydronephrosis or left renal calculus. Increased moderate right hydronephrosis proximal to a 3 mm calculus that has migrated distally to the right ureterovesical junction. GI/Bowel: No acute bowel abnormality. No evidence of appendicitis. Pelvis: Urinary bladder and pelvic organs are unremarkable. Peritoneum/Retroperitoneum: There is no intraperitoneal free air or free fluid. There is no pathologically enlarged or morphologically abnormal lymph node. The abdominal aorta is normal in caliber with mild calcifications. Bones/Soft Tissues: No acute osseous or soft tissue abnormality is seen. There is unchanged grade 1 anterolisthesis of L5 on S1 secondary to bilateral L5 spondylolysis. Increased moderate right hydroureteronephrosis secondary to a 3 mm right ureteral calculus that has migrated distally to the right ureteropelvic junction.      CT ABDOMEN PELVIS WO CONTRAST    Result Date: 3/12/2023  EXAMINATION: CT OF THE ABDOMEN AND PELVIS WITHOUT CONTRAST 3/12/2023 10:17 am TECHNIQUE: CT of the abdomen and pelvis was performed without the administration of intravenous contrast. Multiplanar reformatted images are provided for review. Automated exposure control, iterative reconstruction, and/or weight based adjustment of the mA/kV was utilized to reduce the radiation dose to as low as reasonably achievable. COMPARISON: 01/11/2019 HISTORY: ORDERING SYSTEM PROVIDED HISTORY: flank pain TECHNOLOGIST PROVIDED HISTORY: Reason for exam:->flank pain FINDINGS: Lower Chest: The lower mediastinal structures are unremarkable. Lung bases are clear. Organs: Mild-to-moderate right-sided hydronephrosis, related to a 5 mm stone within the proximal right ureter. On the left, no evidence of nephrolithiasis or hydronephrosis. No acute abnormality involving the unenhanced liver, pancreas, spleen, or adrenal glands. Status post cholecystectomy. GI/Bowel: The small bowel is normal in caliber. Acute, focal inflammatory changes are identified involving the GI tract. Moderate colonic stool burden predominately in the right colon. Pelvis: The urinary bladder and distal ureters are unremarkable. No free fluid in the pelvis. No pelvic lymphadenopathy. Peritoneum/Retroperitoneum: The abdominal aorta is normal in caliber. Bones/Soft Tissues: No acute osseous abnormality. Bilateral pars defects at L5 with grade 1 anterolisthesis. 1. Mild-to-moderate right-sided hydronephrosis, secondary to a 5 mm proximal right ureteral stone. 2. Moderate colonic stool burden which could indicate constipation. Assessment & Plan:      Yaima Tracey is a 62y.o. year old female admitted 3/16/2023 for right flank pain. 1) Right UVJ ureteral calculi 3mm with hydroureter; obstructing: There is no left hydronephrosis or left renal calculus.   Increased moderate right hydronephrosis proximal to a 3 mm calculus that has migrated distally to the right ureterovesical junction. Plan for surgical intervention today if she does not pass the stone on her own. Cystoscopy, possible laser lithotripsy, right ureteroscopy with stone manipulation, right ureteral stent placement in the operating room today. Risks and benefits of surgery discussed with the patient and she is agreeable to proceed with surgery if she does not pass the stone. 2) HANNAH:   BUN 20, Cr 1.4, eGFR 44   UA: blood; large, leukocytes/nitrites; negative   WBC 13.2, T 99.5 @ 0245 03/17/2023      Will follow  Patient seen and examined, chart reviewed.      Electronically signed by SHEREE Parker CNP on 3/17/2023 at 7:13 AM

## 2023-03-18 VITALS
HEIGHT: 67 IN | SYSTOLIC BLOOD PRESSURE: 134 MMHG | DIASTOLIC BLOOD PRESSURE: 82 MMHG | OXYGEN SATURATION: 100 % | TEMPERATURE: 98 F | WEIGHT: 179.68 LBS | HEART RATE: 77 BPM | RESPIRATION RATE: 19 BRPM | BODY MASS INDEX: 28.2 KG/M2

## 2023-03-18 LAB
ALBUMIN SERPL-MCNC: 3.4 GM/DL (ref 3.4–5)
ALP BLD-CCNC: 105 IU/L (ref 40–128)
ALT SERPL-CCNC: 41 U/L (ref 10–40)
ANION GAP SERPL CALCULATED.3IONS-SCNC: 7 MMOL/L (ref 4–16)
AST SERPL-CCNC: 23 IU/L (ref 15–37)
BASOPHILS ABSOLUTE: 0.1 K/CU MM
BASOPHILS RELATIVE PERCENT: 0.8 % (ref 0–1)
BILIRUB SERPL-MCNC: 0.2 MG/DL (ref 0–1)
BUN SERPL-MCNC: 17 MG/DL (ref 6–23)
CALCIUM SERPL-MCNC: 8.6 MG/DL (ref 8.3–10.6)
CHLORIDE BLD-SCNC: 104 MMOL/L (ref 99–110)
CO2: 27 MMOL/L (ref 21–32)
CREAT SERPL-MCNC: 0.8 MG/DL (ref 0.6–1.1)
DIFFERENTIAL TYPE: ABNORMAL
EOSINOPHILS ABSOLUTE: 0.4 K/CU MM
EOSINOPHILS RELATIVE PERCENT: 5 % (ref 0–3)
GFR SERPL CREATININE-BSD FRML MDRD: >60 ML/MIN/1.73M2
GLUCOSE SERPL-MCNC: 101 MG/DL (ref 70–99)
HCT VFR BLD CALC: 41.1 % (ref 37–47)
HEMOGLOBIN: 12.9 GM/DL (ref 12.5–16)
IMMATURE NEUTROPHIL %: 0.3 % (ref 0–0.43)
LYMPHOCYTES ABSOLUTE: 1.9 K/CU MM
LYMPHOCYTES RELATIVE PERCENT: 23.7 % (ref 24–44)
MCH RBC QN AUTO: 28.4 PG (ref 27–31)
MCHC RBC AUTO-ENTMCNC: 31.4 % (ref 32–36)
MCV RBC AUTO: 90.5 FL (ref 78–100)
MONOCYTES ABSOLUTE: 0.7 K/CU MM
MONOCYTES RELATIVE PERCENT: 9 % (ref 0–4)
NUCLEATED RBC %: 0 %
PDW BLD-RTO: 14.1 % (ref 11.7–14.9)
PLATELET # BLD: 261 K/CU MM (ref 140–440)
PMV BLD AUTO: 10.5 FL (ref 7.5–11.1)
POTASSIUM SERPL-SCNC: 4.4 MMOL/L (ref 3.5–5.1)
RBC # BLD: 4.54 M/CU MM (ref 4.2–5.4)
SEGMENTED NEUTROPHILS ABSOLUTE COUNT: 4.8 K/CU MM
SEGMENTED NEUTROPHILS RELATIVE PERCENT: 61.2 % (ref 36–66)
SODIUM BLD-SCNC: 138 MMOL/L (ref 135–145)
TOTAL IMMATURE NEUTOROPHIL: 0.02 K/CU MM
TOTAL NUCLEATED RBC: 0 K/CU MM
TOTAL PROTEIN: 5.2 GM/DL (ref 6.4–8.2)
WBC # BLD: 7.8 K/CU MM (ref 4–10.5)

## 2023-03-18 PROCEDURE — 85025 COMPLETE CBC W/AUTO DIFF WBC: CPT

## 2023-03-18 PROCEDURE — 6360000002 HC RX W HCPCS: Performed by: STUDENT IN AN ORGANIZED HEALTH CARE EDUCATION/TRAINING PROGRAM

## 2023-03-18 PROCEDURE — 94761 N-INVAS EAR/PLS OXIMETRY MLT: CPT

## 2023-03-18 PROCEDURE — 6370000000 HC RX 637 (ALT 250 FOR IP): Performed by: STUDENT IN AN ORGANIZED HEALTH CARE EDUCATION/TRAINING PROGRAM

## 2023-03-18 PROCEDURE — 83970 ASSAY OF PARATHORMONE: CPT

## 2023-03-18 PROCEDURE — 2580000003 HC RX 258: Performed by: STUDENT IN AN ORGANIZED HEALTH CARE EDUCATION/TRAINING PROGRAM

## 2023-03-18 PROCEDURE — 80053 COMPREHEN METABOLIC PANEL: CPT

## 2023-03-18 PROCEDURE — 36415 COLL VENOUS BLD VENIPUNCTURE: CPT

## 2023-03-18 RX ADMIN — SODIUM CHLORIDE, PRESERVATIVE FREE 10 ML: 5 INJECTION INTRAVENOUS at 09:03

## 2023-03-18 RX ADMIN — MULTIPLE VITAMINS W/ MINERALS TAB 1 TABLET: TAB at 09:02

## 2023-03-18 RX ADMIN — OXYCODONE HYDROCHLORIDE 5 MG: 5 TABLET ORAL at 01:30

## 2023-03-18 ASSESSMENT — PAIN SCALES - GENERAL
PAINLEVEL_OUTOF10: 0
PAINLEVEL_OUTOF10: 4
PAINLEVEL_OUTOF10: 0

## 2023-03-18 ASSESSMENT — PAIN - FUNCTIONAL ASSESSMENT: PAIN_FUNCTIONAL_ASSESSMENT: ACTIVITIES ARE NOT PREVENTED

## 2023-03-18 ASSESSMENT — PAIN DESCRIPTION - ONSET: ONSET: ON-GOING

## 2023-03-18 ASSESSMENT — PAIN DESCRIPTION - DESCRIPTORS: DESCRIPTORS: ACHING;DULL

## 2023-03-18 ASSESSMENT — PAIN DESCRIPTION - FREQUENCY: FREQUENCY: INTERMITTENT

## 2023-03-18 ASSESSMENT — PAIN DESCRIPTION - LOCATION: LOCATION: ABDOMEN;HEAD

## 2023-03-18 ASSESSMENT — PAIN DESCRIPTION - ORIENTATION: ORIENTATION: RIGHT

## 2023-03-18 ASSESSMENT — PAIN DESCRIPTION - PAIN TYPE: TYPE: ACUTE PAIN;SURGICAL PAIN

## 2023-03-18 NOTE — PROGRESS NOTES
[facility]   Progress Note  [facility] 0 1 2  Date: 3/18/2023   Patient: Johnathan Yepez   : 1965   DOA: 3/16/2023   MRN: [id number]   ROOM#: [id number]     Admit Date: 3/16/2023     Collaborating Urologist on Call at time of admission: Dr. Mary Salazar    CC: Flank pain    Subjective:     Pain: minimal, no nausea and no vomiting  Bowel Movement/Flatus:   Yes  Voiding:  easily    Pt doing well. States she is ready to go home.     Objective:      Vitals:    BP 99/64   Pulse 78   Temp 98.2 °F (36.8 °C) (Oral)   Resp 16   Ht 5' 7\" (1.702 m)   Wt 179 lb 10.8 oz (81.5 kg)   SpO2 96%   BMI 28.14 kg/m²    Temp  Av.7 °F (36.5 °C)  Min: 97.2 °F (36.2 °C)  Max: 98.2 °F (36.8 °C)     Intake/Output Summary (Last 24 hours) at 3/18/2023 0836  Last data filed at 3/18/2023 0023  Gross per 24 hour   Intake 265 ml   Output 575 ml   Net -310 ml       Physical Exam:   General appearance: alert, appears stated age, cooperative, and no distress  Head: Normocephalic, without obvious abnormality, atraumatic  Back:  No CVA tenderness  Abdomen:  Soft, non-tender, non-distended    Labs:   WBC:    Lab Results   Component Value Date/Time    WBC 7.8 2023 03:12 AM      Hemoglobin/Hematocrit:    Lab Results   Component Value Date/Time    HGB 12.9 2023 03:12 AM    HCT 41.1 2023 03:12 AM      BMP:   Lab Results   Component Value Date/Time     2023 03:12 AM    K 4.4 2023 03:12 AM     2023 03:12 AM    CO2 27 2023 03:12 AM    BUN 17 2023 03:12 AM    LABALBU 3.4 2023 03:12 AM    CREATININE 0.8 2023 03:12 AM    CALCIUM 8.6 2023 03:12 AM    GFRAA >60 2021 01:00 PM    LABGLOM >60 2023 03:12 AM      PT/INR:  No results found for: PROTIME, INR   PTT:  No results found for: APTT    Imaging:   CT ABDOMEN PELVIS WO CONTRAST Additional Contrast? None    Result Date: 3/16/2023  EXAMINATION: CT OF THE ABDOMEN AND PELVIS WITHOUT CONTRAST 3/16/2023 7:40 pm TECHNIQUE: CT of the abdomen and pelvis was performed without the administration of intravenous contrast. Multiplanar reformatted images are provided for review. Automated exposure control, iterative reconstruction, and/or weight based adjustment of the mA/kV was utilized to reduce the radiation dose to as low as reasonably achievable. COMPARISON: 03/12/2023 HISTORY: ORDERING SYSTEM PROVIDED HISTORY: kidney stones, dysuria rule out stones TECHNOLOGIST PROVIDED HISTORY: Reason for exam:->kidney stones, dysuria rule out stones Additional Contrast?->None Decision Support Exception - unselect if not a suspected or confirmed emergency medical condition->Emergency Medical Condition (MA) Reason for Exam: kidney stones, dysuria FINDINGS: Lower Chest: No acute abnormality in the visualized lower thorax. Chronic calcified granulomatous changes in the right lower lobe. Organs: Limited evaluation due lack of intravenous contrast.  Prior cholecystectomy. No biliary ductal dilatation. Liver, pancreas, spleen, adrenal glands, and left kidney are unremarkable. There is no left hydronephrosis or left renal calculus. Increased moderate right hydronephrosis proximal to a 3 mm calculus that has migrated distally to the right ureterovesical junction. GI/Bowel: No acute bowel abnormality. No evidence of appendicitis. Pelvis: Urinary bladder and pelvic organs are unremarkable. Peritoneum/Retroperitoneum: There is no intraperitoneal free air or free fluid. There is no pathologically enlarged or morphologically abnormal lymph node. The abdominal aorta is normal in caliber with mild calcifications. Bones/Soft Tissues: No acute osseous or soft tissue abnormality is seen. There is unchanged grade 1 anterolisthesis of L5 on S1 secondary to bilateral L5 spondylolysis.      Increased moderate right hydroureteronephrosis secondary to a 3 mm right ureteral calculus that has migrated distally to the right ureteropelvic junction. CT ABDOMEN PELVIS WO CONTRAST    Result Date: 3/12/2023  EXAMINATION: CT OF THE ABDOMEN AND PELVIS WITHOUT CONTRAST 3/12/2023 10:17 am TECHNIQUE: CT of the abdomen and pelvis was performed without the administration of intravenous contrast. Multiplanar reformatted images are provided for review. Automated exposure control, iterative reconstruction, and/or weight based adjustment of the mA/kV was utilized to reduce the radiation dose to as low as reasonably achievable. COMPARISON: 01/11/2019 HISTORY: ORDERING SYSTEM PROVIDED HISTORY: flank pain TECHNOLOGIST PROVIDED HISTORY: Reason for exam:->flank pain FINDINGS: Lower Chest: The lower mediastinal structures are unremarkable. Lung bases are clear. Organs: Mild-to-moderate right-sided hydronephrosis, related to a 5 mm stone within the proximal right ureter. On the left, no evidence of nephrolithiasis or hydronephrosis. No acute abnormality involving the unenhanced liver, pancreas, spleen, or adrenal glands. Status post cholecystectomy. GI/Bowel: The small bowel is normal in caliber. Acute, focal inflammatory changes are identified involving the GI tract. Moderate colonic stool burden predominately in the right colon. Pelvis: The urinary bladder and distal ureters are unremarkable. No free fluid in the pelvis. No pelvic lymphadenopathy. Peritoneum/Retroperitoneum: The abdominal aorta is normal in caliber. Bones/Soft Tissues: No acute osseous abnormality. Bilateral pars defects at L5 with grade 1 anterolisthesis. 1. Mild-to-moderate right-sided hydronephrosis, secondary to a 5 mm proximal right ureteral stone. 2. Moderate colonic stool burden which could indicate constipation. FL LESS THAN 1 HOUR    Result Date: 3/17/2023  Radiology exam is complete. No Radiologist dictation. Please follow up with ordering provider. Assessment & Plan:      Alejandro Londono is a 62 y.o. female admitted 3/16/2023 for right flank pain.      1) Right UVJ ureteral calculi 3mm with hydroureter: CT abd/pelvis shows no left hydronephrosis or left renal calculus, increased moderate right hydronephrosis proximal to a 3 mm calculus that has migrated distally to the right UVJ              POD #1 cystoscopy, right RGPG, right ureteroscopy with findings of inflamed right UO and no stone visible suggesting recent passage. No stent placement required. Plan for 1 month f/u with renal US. UA large blood and leuks/nitrites negative              WBC 7.8, afebrile/VSS, on Rocephin  2) HANNAH: Cr 0.8 from 1.4    Patient stable from a  standpoint. Will sign off. Please call with any questions. Outpatient follow up in 1 month with renal US. Patient seen and examined, chart reviewed.      Electronically signed by TARUN Reyes on 3/18/2023 at 8:36 AM

## 2023-03-18 NOTE — PROGRESS NOTES
Nephrology Progress Note  3/18/2023 9:03 AM        Subjective:   Admit Date: 3/16/2023  PCP: Hugo Cruz MD    Interval History: Did not sleep very well last night, feeling fatigued tired and sleepy  Underwent cystoscopy and ureteroscopy, at the right side. Apparently the cyst stone has passed, no stent was placed. She tolerated procedure uneventfully    Diet: Some    ROS: Still has on and off right flank pain-no fever and acceptable blood pressure-urine output recorded 575 cc for the last 24 hours likely not accurately recorded    Data:     Current meds:    cyanocobalamin  1,000 mcg IntraMUSCular Q30 Days    vitamin D  50,000 Units Oral Weekly    fluticasone  2 spray Each Nostril Daily    therapeutic multivitamin-minerals  1 tablet Oral Daily    sodium chloride flush  5-40 mL IntraVENous 2 times per day    enoxaparin  40 mg SubCUTAneous Daily    nicotine  1 patch TransDERmal Daily    cefTRIAXone (ROCEPHIN) IV  1,000 mg IntraVENous Q24H      sodium chloride           I/O last 3 completed shifts:   In: 265 [I.V.:215; IV Piggyback:50]  Out: 1175 [Urine:1175]    CBC:   Recent Labs     03/16/23  1730 03/17/23  1008 03/18/23  0312   WBC 13.2* 9.1 7.8   HGB 15.6 13.3 12.9    251 261          Recent Labs     03/16/23  1730 03/17/23  1008 03/18/23  0312    138 138   K 4.4 4.5 4.4    105 104   CO2 23 25 27   BUN 20 19 17   CREATININE 1.4* 0.8 0.8   GLUCOSE 110* 81 101*       Lab Results   Component Value Date    CALCIUM 8.6 03/18/2023       Objective:     Vitals: /82   Pulse 78   Temp 98 °F (36.7 °C) (Oral)   Resp 16   Ht 5' 7\" (1.702 m)   Wt 179 lb 10.8 oz (81.5 kg)   SpO2 100%   BMI 28.14 kg/m² ,    General appearance: Alert and awake but sleepy and tired  HEENT: No gross conjunctival pallor or scleral icterus  Neck: Supple  Lungs: No crackles on auscultation  Heart: Regular rate and rhythm  Abdomen: Soft, nontender  Extremities: No gross edema      Problem List :         Impression :     Acute kidney injury-quick reversal of creatinine suggest mainly prerenal-and perhaps transient obstruction not right kidney-unlikely she had structural damage  Kidney stone, recurrent event-needs outpatient work-up for etiology as well as prevention  Smoking need to abstain and stop    Recommendation/Plan  :     Okay to discharge from my standpoint-her PTH is pending-I strongly encouraged her to drink 2 to 3 L of water, stop any other beverage, low-salt, DASH diet, abstain from smoking, 30 minutes physical activity, good night sleep  Stress, follow-up with me in 2 to 3 weeks-I can see her in [facility] Jose Luis Greene MD MD

## 2023-03-19 LAB
CREATININE URINE: 161.8 MG/DL (ref 28–217)
PROT/CREAT RATIO, UR: 0.4
PTH-INTACT SERPL-MCNC: 50 PG/ML (ref 15–65)
URINE TOTAL PROTEIN: 57 MG/DL

## 2023-03-20 ENCOUNTER — TELEPHONE (OUTPATIENT)
Dept: FAMILY MEDICINE CLINIC | Age: 58
End: 2023-03-20

## 2023-03-20 NOTE — TELEPHONE ENCOUNTER
Care Transitions Initial Follow Up Call    Outreach made within 2 business days of discharge: Yes    Patient: Marychuy Hollins Patient : 1965   MRN: [id number]  Reason for Admission: There are no discharge diagnoses documented for the most recent discharge. Discharge Date: 3/18/23       Spoke with: left a vm for pt to call to schedule appt      Discharge department/facility: [facility] Interactive Patient Contact:  Scheduled appointment with PCP within 7-14 days    Follow Up  No future appointments.     Renetta Spicer MA

## 2025-04-15 ENCOUNTER — HOSPITAL ENCOUNTER (EMERGENCY)
Age: 60
Discharge: HOME | End: 2025-04-15
Attending: EMERGENCY MEDICINE
Payer: COMMERCIAL

## 2025-04-15 VITALS
SYSTOLIC BLOOD PRESSURE: 165 MMHG | WEIGHT: 165 LBS | HEIGHT: 68 IN | HEART RATE: 99 BPM | TEMPERATURE: 97.7 F | DIASTOLIC BLOOD PRESSURE: 97 MMHG | RESPIRATION RATE: 18 BRPM | BODY MASS INDEX: 25.01 KG/M2 | OXYGEN SATURATION: 95 %

## 2025-04-15 DIAGNOSIS — H10.503 BLEPHAROCONJUNCTIVITIS OF BOTH EYES, UNSPECIFIED BLEPHAROCONJUNCTIVITIS TYPE: ICD-10-CM

## 2025-04-15 DIAGNOSIS — H57.89 IRRITATION OF BOTH EYES: Primary | ICD-10-CM

## 2025-04-15 PROCEDURE — 99283 EMERGENCY DEPT VISIT LOW MDM: CPT

## 2025-04-15 RX ORDER — ERYTHROMYCIN 5 MG/G
OINTMENT OPHTHALMIC ONCE
Status: DISCONTINUED | OUTPATIENT
Start: 2025-04-15 | End: 2025-04-15 | Stop reason: HOSPADM

## 2025-04-15 RX ORDER — NEOMYCIN/POLYMYXIN B/HYDROCORT 3.5-10K-1
2 SUSPENSION, DROPS(FINAL DOSAGE FORM)(ML) OPHTHALMIC (EYE) EVERY 6 HOURS
Qty: 4 ML | Refills: 0 | Status: SHIPPED | OUTPATIENT
Start: 2025-04-15 | End: 2025-04-25

## 2025-04-15 ASSESSMENT — ENCOUNTER SYMPTOMS
CRUSTING: 1
EYE REDNESS: 1
GASTROINTESTINAL NEGATIVE: 1
EYE INFLAMMATION: 1
RESPIRATORY NEGATIVE: 1
BLURRED VISION: 0
EYE ITCHING: 1
EYE WATERING: 1

## 2025-04-15 ASSESSMENT — PAIN DESCRIPTION - PAIN TYPE: TYPE: ACUTE PAIN

## 2025-04-15 ASSESSMENT — PAIN DESCRIPTION - ONSET: ONSET: SUDDEN

## 2025-04-15 ASSESSMENT — PAIN - FUNCTIONAL ASSESSMENT
PAIN_FUNCTIONAL_ASSESSMENT: NONE - DENIES PAIN
PAIN_FUNCTIONAL_ASSESSMENT: 0-10
PAIN_FUNCTIONAL_ASSESSMENT: PREVENTS OR INTERFERES SOME ACTIVE ACTIVITIES AND ADLS

## 2025-04-15 ASSESSMENT — PAIN DESCRIPTION - ORIENTATION: ORIENTATION: RIGHT;LEFT

## 2025-04-15 ASSESSMENT — PAIN DESCRIPTION - LOCATION: LOCATION: EYE

## 2025-04-15 ASSESSMENT — PAIN SCALES - GENERAL: PAINLEVEL_OUTOF10: 4

## 2025-04-15 ASSESSMENT — LIFESTYLE VARIABLES
HOW OFTEN DO YOU HAVE A DRINK CONTAINING ALCOHOL: NEVER
HOW MANY STANDARD DRINKS CONTAINING ALCOHOL DO YOU HAVE ON A TYPICAL DAY: PATIENT DOES NOT DRINK

## 2025-04-15 ASSESSMENT — PAIN DESCRIPTION - FREQUENCY: FREQUENCY: CONTINUOUS

## 2025-04-15 ASSESSMENT — PAIN DESCRIPTION - DESCRIPTORS: DESCRIPTORS: BURNING

## 2025-04-16 NOTE — ED PROVIDER NOTES
The history is provided by the patient.   Eye Problem  Location:  Both eyes  Quality:  Tearing and stinging  Severity:  Moderate  Onset quality:  Sudden  Timing:  Constant  Progression:  Worsening  Chronicity:  New  Context comment:  Eyes have been itching and burning since yesterday.  Relieved by:  Nothing  Ineffective treatments:  None tried  Associated symptoms: crusting, inflammation, itching, redness and tearing    Associated symptoms: no blurred vision        Review of Systems   Constitutional: Negative.    HENT: Negative.     Eyes:  Positive for redness and itching. Negative for blurred vision.   Respiratory: Negative.     Cardiovascular: Negative.    Gastrointestinal: Negative.    Genitourinary: Negative.    Musculoskeletal: Negative.    Skin: Negative.    Neurological: Negative.    All other systems reviewed and are negative.      Family History   Problem Relation Age of Onset    Cancer Mother     Diabetes Mother     High Blood Pressure Mother     Obesity Mother     High Cholesterol Mother     Allergies Father     Diabetes Son     Depression Son     Stroke Maternal Grandfather      Social History     Socioeconomic History    Marital status: Single     Spouse name: Not on file    Number of children: Not on file    Years of education: Not on file    Highest education level: Not on file   Occupational History    Not on file   Tobacco Use    Smoking status: Every Day     Current packs/day: 1.50     Average packs/day: 1.5 packs/day for 44.3 years (66.5 ttl pk-yrs)     Types: Cigarettes     Start date: 12/19/1980    Smokeless tobacco: Never   Vaping Use    Vaping status: Never Used   Substance and Sexual Activity    Alcohol use: Yes     Comment: occassionally    Drug use: No    Sexual activity: Not Currently   Other Topics Concern    Not on file   Social History Narrative    Not on file     Social Drivers of Health     Financial Resource Strain: Not on file   Food Insecurity: Not on file   Transportation Needs:

## (undated) DEVICE — OPEN-END FLEXI-TIP URETERAL CATHETER: Brand: FLEXI-TIP

## (undated) DEVICE — SINGLE PORT MANIFOLD: Brand: NEPTUNE 2

## (undated) DEVICE — GUIDEWIRE URO L150CM DIA0.038IN STD NIT HYDRPHLC STR TIP

## (undated) DEVICE — GLOVE ORANGE PI 7   MSG9070

## (undated) DEVICE — SOLUTION IV IRRIG WATER 1000ML POUR BRL 2F7114

## (undated) DEVICE — GOWN,SIRUS,POLYRNF,BRTHSLV,XLN/XL,20/CS: Brand: MEDLINE

## (undated) DEVICE — Z INACTIVE USE 2635503 SOLUTION IRRIG 3000ML ST H2O USP UROMATIC PLAS CONT

## (undated) DEVICE — DBD-PACK,CYSTOSCOPY,PK VI,AURORA: Brand: MEDLINE

## (undated) DEVICE — MARKER SURG SKIN UTIL REGULAR/FINE 2 TIP W/ RUL AND 9 LBL

## (undated) DEVICE — SYRINGE MED 20ML STD CLR PLAS LUERLOCK TIP N CTRL DISP

## (undated) DEVICE — TUBING, SUCTION, 9/32" X 20', STRAIGHT: Brand: MEDLINE INDUSTRIES, INC.

## (undated) DEVICE — JELLY,LUBE,STERILE,FLIP TOP,TUBE,2-OZ: Brand: MEDLINE

## (undated) DEVICE — SET IRRIG L94IN ID0.281IN W/ 4.5IN DST FLX CONN 2 LD ON OFF

## (undated) DEVICE — MAT ABSRB W28XL48IN C6L FLR ULT W POLY BK

## (undated) DEVICE — TRAY PREP DRY W/ PREM GLV 2 APPL 6 SPNG 2 UNDPD 1 OVERWRAP